# Patient Record
Sex: MALE | Race: WHITE | Employment: OTHER | URBAN - METROPOLITAN AREA
[De-identification: names, ages, dates, MRNs, and addresses within clinical notes are randomized per-mention and may not be internally consistent; named-entity substitution may affect disease eponyms.]

---

## 2017-01-03 ENCOUNTER — OFFICE VISIT (OUTPATIENT)
Dept: FAMILY MEDICINE CLINIC | Age: 47
End: 2017-01-03

## 2017-01-03 VITALS
SYSTOLIC BLOOD PRESSURE: 112 MMHG | OXYGEN SATURATION: 99 % | RESPIRATION RATE: 16 BRPM | BODY MASS INDEX: 20.66 KG/M2 | HEART RATE: 54 BPM | DIASTOLIC BLOOD PRESSURE: 74 MMHG | WEIGHT: 169.7 LBS | HEIGHT: 76 IN

## 2017-01-03 DIAGNOSIS — R07.89 CHRONIC CHEST WALL PAIN: ICD-10-CM

## 2017-01-03 DIAGNOSIS — Z90.2 S/P LOBECTOMY OF LUNG: ICD-10-CM

## 2017-01-03 DIAGNOSIS — J41.0 SIMPLE CHRONIC BRONCHITIS (HCC): ICD-10-CM

## 2017-01-03 DIAGNOSIS — F41.8 DEPRESSION WITH ANXIETY: Primary | ICD-10-CM

## 2017-01-03 DIAGNOSIS — F41.0 PANIC ATTACK: ICD-10-CM

## 2017-01-03 DIAGNOSIS — G89.29 CHRONIC CHEST WALL PAIN: ICD-10-CM

## 2017-01-03 PROCEDURE — 99213 OFFICE O/P EST LOW 20 MIN: CPT | Performed by: NURSE PRACTITIONER

## 2017-01-03 RX ORDER — LIDOCAINE 50 MG/G
1 PATCH TOPICAL DAILY
Qty: 90 PATCH | Refills: 0 | Status: SHIPPED | OUTPATIENT
Start: 2017-01-03 | End: 2017-01-12

## 2017-01-03 RX ORDER — SERTRALINE HYDROCHLORIDE 100 MG/1
200 TABLET, FILM COATED ORAL DAILY
Qty: 60 TABLET | Refills: 1 | Status: SHIPPED | OUTPATIENT
Start: 2017-01-03 | End: 2017-01-12

## 2017-01-03 RX ORDER — LORAZEPAM 0.5 MG/1
0.5 TABLET ORAL EVERY 12 HOURS PRN
Qty: 15 TABLET | Refills: 0 | Status: SHIPPED | OUTPATIENT
Start: 2017-01-03 | End: 2017-01-10

## 2017-01-03 RX ORDER — AMITRIPTYLINE HYDROCHLORIDE 50 MG/1
50 TABLET, FILM COATED ORAL NIGHTLY
Qty: 30 TABLET | Refills: 1 | Status: SHIPPED | OUTPATIENT
Start: 2017-01-03 | End: 2017-02-17 | Stop reason: ALTCHOICE

## 2017-01-04 ENCOUNTER — TELEPHONE (OUTPATIENT)
Dept: FAMILY MEDICINE CLINIC | Age: 47
End: 2017-01-04

## 2017-01-04 ASSESSMENT — ENCOUNTER SYMPTOMS
SHORTNESS OF BREATH: 1
NAUSEA: 0
WHEEZING: 0
COUGH: 0
CHEST TIGHTNESS: 0
ABDOMINAL PAIN: 0

## 2017-01-05 ENCOUNTER — TELEPHONE (OUTPATIENT)
Dept: FAMILY MEDICINE CLINIC | Age: 47
End: 2017-01-05

## 2017-01-05 ENCOUNTER — TELEPHONE (OUTPATIENT)
Age: 47
End: 2017-01-05

## 2017-01-05 DIAGNOSIS — R07.89 CHEST WALL PAIN: Primary | ICD-10-CM

## 2017-01-06 ENCOUNTER — TELEPHONE (OUTPATIENT)
Dept: PULMONOLOGY | Age: 47
End: 2017-01-06

## 2017-01-09 ENCOUNTER — TELEPHONE (OUTPATIENT)
Dept: PULMONOLOGY | Age: 47
End: 2017-01-09

## 2017-01-10 ENCOUNTER — OFFICE VISIT (OUTPATIENT)
Dept: PULMONOLOGY | Age: 47
End: 2017-01-10

## 2017-01-10 ENCOUNTER — TELEPHONE (OUTPATIENT)
Dept: PULMONOLOGY | Age: 47
End: 2017-01-10

## 2017-01-10 VITALS
WEIGHT: 169.2 LBS | HEIGHT: 76 IN | BODY MASS INDEX: 20.6 KG/M2 | HEART RATE: 70 BPM | DIASTOLIC BLOOD PRESSURE: 88 MMHG | SYSTOLIC BLOOD PRESSURE: 124 MMHG | TEMPERATURE: 97.7 F | OXYGEN SATURATION: 98 %

## 2017-01-10 DIAGNOSIS — G89.18 CHEST WALL PAIN FOLLOWING SURGERY: Primary | ICD-10-CM

## 2017-01-10 DIAGNOSIS — R07.89 CHEST WALL PAIN FOLLOWING SURGERY: Primary | ICD-10-CM

## 2017-01-10 DIAGNOSIS — J18.9 PNEUMONIA OF LEFT UPPER LOBE DUE TO INFECTIOUS ORGANISM: ICD-10-CM

## 2017-01-10 DIAGNOSIS — J86.9 EMPYEMA LUNG (HCC): ICD-10-CM

## 2017-01-10 DIAGNOSIS — Z90.2 S/P LOBECTOMY OF LUNG: ICD-10-CM

## 2017-01-10 DIAGNOSIS — B39.9 HISTOPLASMOSIS: ICD-10-CM

## 2017-01-10 PROCEDURE — 99213 OFFICE O/P EST LOW 20 MIN: CPT | Performed by: PHYSICIAN ASSISTANT

## 2017-01-10 ASSESSMENT — ENCOUNTER SYMPTOMS
SHORTNESS OF BREATH: 1
GASTROINTESTINAL NEGATIVE: 1

## 2017-01-12 PROBLEM — F41.9 ANXIETY: Status: ACTIVE | Noted: 2017-01-12

## 2017-01-12 PROBLEM — R07.1 CHEST PAIN ON BREATHING: Status: ACTIVE | Noted: 2017-01-12

## 2017-01-12 PROBLEM — E87.3 ACUTE RESPIRATORY ALKALOSIS: Status: ACTIVE | Noted: 2017-01-12

## 2017-01-12 PROBLEM — R06.02 SHORTNESS OF BREATH: Status: ACTIVE | Noted: 2017-01-12

## 2017-01-12 PROBLEM — G89.4 CHRONIC PAIN SYNDROME: Chronic | Status: ACTIVE | Noted: 2017-01-12

## 2017-01-16 ENCOUNTER — TELEPHONE (OUTPATIENT)
Dept: FAMILY MEDICINE CLINIC | Age: 47
End: 2017-01-16

## 2017-01-17 ENCOUNTER — TELEPHONE (OUTPATIENT)
Dept: FAMILY MEDICINE CLINIC | Age: 47
End: 2017-01-17

## 2017-01-20 ENCOUNTER — TELEPHONE (OUTPATIENT)
Dept: FAMILY MEDICINE CLINIC | Age: 47
End: 2017-01-20

## 2017-01-20 ENCOUNTER — OFFICE VISIT (OUTPATIENT)
Dept: FAMILY MEDICINE CLINIC | Age: 47
End: 2017-01-20

## 2017-01-20 VITALS
SYSTOLIC BLOOD PRESSURE: 96 MMHG | WEIGHT: 163.5 LBS | HEART RATE: 87 BPM | RESPIRATION RATE: 16 BRPM | HEIGHT: 76 IN | DIASTOLIC BLOOD PRESSURE: 66 MMHG | OXYGEN SATURATION: 97 % | TEMPERATURE: 97 F | BODY MASS INDEX: 19.91 KG/M2

## 2017-01-20 DIAGNOSIS — B39.9 HISTOPLASMOSIS: ICD-10-CM

## 2017-01-20 DIAGNOSIS — J43.9 PULMONARY EMPHYSEMA, UNSPECIFIED EMPHYSEMA TYPE (HCC): Primary | ICD-10-CM

## 2017-01-20 DIAGNOSIS — F41.8 DEPRESSION WITH ANXIETY: ICD-10-CM

## 2017-01-20 DIAGNOSIS — Z90.2 S/P LOBECTOMY OF LUNG: ICD-10-CM

## 2017-01-20 PROCEDURE — 99495 TRANSJ CARE MGMT MOD F2F 14D: CPT | Performed by: NURSE PRACTITIONER

## 2017-01-20 RX ORDER — BUPROPION HYDROCHLORIDE 300 MG/1
300 TABLET ORAL EVERY MORNING
Qty: 30 TABLET | Refills: 1 | Status: SHIPPED | OUTPATIENT
Start: 2017-01-20 | End: 2017-02-13 | Stop reason: SDUPTHER

## 2017-01-20 RX ORDER — LORAZEPAM 0.5 MG/1
0.5 TABLET ORAL 2 TIMES DAILY PRN
COMMUNITY
End: 2017-01-20 | Stop reason: SDUPTHER

## 2017-01-20 RX ORDER — LORAZEPAM 0.5 MG/1
0.5 TABLET ORAL 2 TIMES DAILY PRN
Qty: 10 TABLET | Refills: 0 | Status: SHIPPED | OUTPATIENT
Start: 2017-01-20 | End: 2017-02-17 | Stop reason: SDUPTHER

## 2017-01-20 RX ORDER — SERTRALINE HYDROCHLORIDE 100 MG/1
100 TABLET, FILM COATED ORAL DAILY
Qty: 30 TABLET | Refills: 1 | Status: SHIPPED | OUTPATIENT
Start: 2017-01-20 | End: 2017-02-13 | Stop reason: SDUPTHER

## 2017-01-20 ASSESSMENT — PATIENT HEALTH QUESTIONNAIRE - PHQ9
1. LITTLE INTEREST OR PLEASURE IN DOING THINGS: 0
SUM OF ALL RESPONSES TO PHQ QUESTIONS 1-9: 0
SUM OF ALL RESPONSES TO PHQ9 QUESTIONS 1 & 2: 0

## 2017-01-21 ASSESSMENT — ENCOUNTER SYMPTOMS
CHEST TIGHTNESS: 0
SHORTNESS OF BREATH: 1
NAUSEA: 0
COUGH: 0
ABDOMINAL PAIN: 0
WHEEZING: 0

## 2017-01-23 ENCOUNTER — TELEPHONE (OUTPATIENT)
Dept: FAMILY MEDICINE CLINIC | Age: 47
End: 2017-01-23

## 2017-02-13 DIAGNOSIS — F41.8 DEPRESSION WITH ANXIETY: ICD-10-CM

## 2017-02-13 RX ORDER — LORAZEPAM 0.5 MG/1
0.5 TABLET ORAL 2 TIMES DAILY PRN
Qty: 10 TABLET | Refills: 0 | OUTPATIENT
Start: 2017-02-13

## 2017-02-13 RX ORDER — ALBUTEROL SULFATE 90 UG/1
2 AEROSOL, METERED RESPIRATORY (INHALATION) EVERY 6 HOURS PRN
Qty: 1 INHALER | Refills: 3 | Status: SHIPPED | OUTPATIENT
Start: 2017-02-13 | End: 2017-07-17 | Stop reason: ALTCHOICE

## 2017-02-13 RX ORDER — SERTRALINE HYDROCHLORIDE 100 MG/1
100 TABLET, FILM COATED ORAL DAILY
Qty: 30 TABLET | Refills: 1 | Status: SHIPPED | OUTPATIENT
Start: 2017-02-13 | End: 2017-02-17 | Stop reason: ALTCHOICE

## 2017-02-13 RX ORDER — NAPROXEN 500 MG/1
500 TABLET ORAL 2 TIMES DAILY WITH MEALS
Qty: 60 TABLET | Refills: 3 | Status: SHIPPED | OUTPATIENT
Start: 2017-02-13 | End: 2017-11-16 | Stop reason: SDUPTHER

## 2017-02-13 RX ORDER — BUPROPION HYDROCHLORIDE 300 MG/1
300 TABLET ORAL EVERY MORNING
Qty: 30 TABLET | Refills: 1 | Status: SHIPPED | OUTPATIENT
Start: 2017-02-13 | End: 2017-02-17 | Stop reason: SDUPTHER

## 2017-02-17 ENCOUNTER — OFFICE VISIT (OUTPATIENT)
Dept: FAMILY MEDICINE CLINIC | Age: 47
End: 2017-02-17

## 2017-02-17 VITALS
WEIGHT: 179.5 LBS | RESPIRATION RATE: 16 BRPM | OXYGEN SATURATION: 99 % | DIASTOLIC BLOOD PRESSURE: 74 MMHG | BODY MASS INDEX: 21.86 KG/M2 | HEIGHT: 76 IN | TEMPERATURE: 98.1 F | SYSTOLIC BLOOD PRESSURE: 136 MMHG | HEART RATE: 70 BPM

## 2017-02-17 DIAGNOSIS — F41.8 DEPRESSION WITH ANXIETY: Primary | ICD-10-CM

## 2017-02-17 DIAGNOSIS — J44.1 COPD EXACERBATION (HCC): ICD-10-CM

## 2017-02-17 DIAGNOSIS — J41.0 SIMPLE CHRONIC BRONCHITIS (HCC): ICD-10-CM

## 2017-02-17 DIAGNOSIS — G89.29 CHRONIC CHEST WALL PAIN: ICD-10-CM

## 2017-02-17 DIAGNOSIS — R07.89 CHRONIC CHEST WALL PAIN: ICD-10-CM

## 2017-02-17 DIAGNOSIS — Z90.2 S/P LOBECTOMY OF LUNG: ICD-10-CM

## 2017-02-17 PROBLEM — R07.1 CHEST PAIN ON BREATHING: Status: RESOLVED | Noted: 2017-01-12 | Resolved: 2017-02-17

## 2017-02-17 PROBLEM — E87.3 ACUTE RESPIRATORY ALKALOSIS: Status: RESOLVED | Noted: 2017-01-12 | Resolved: 2017-02-17

## 2017-02-17 PROBLEM — R06.02 SHORTNESS OF BREATH: Status: RESOLVED | Noted: 2017-01-12 | Resolved: 2017-02-17

## 2017-02-17 PROCEDURE — 99214 OFFICE O/P EST MOD 30 MIN: CPT | Performed by: NURSE PRACTITIONER

## 2017-02-17 RX ORDER — SERTRALINE HYDROCHLORIDE 100 MG/1
150 TABLET, FILM COATED ORAL DAILY
Qty: 45 TABLET | Refills: 1 | Status: SHIPPED | OUTPATIENT
Start: 2017-02-17 | End: 2017-09-14 | Stop reason: SDUPTHER

## 2017-02-17 RX ORDER — LORAZEPAM 0.5 MG/1
0.5 TABLET ORAL 2 TIMES DAILY PRN
Qty: 10 TABLET | Refills: 0 | Status: ON HOLD | OUTPATIENT
Start: 2017-02-17 | End: 2017-02-28 | Stop reason: HOSPADM

## 2017-02-17 RX ORDER — AMITRIPTYLINE HYDROCHLORIDE 100 MG/1
100 TABLET, FILM COATED ORAL NIGHTLY
Qty: 30 TABLET | Refills: 3 | Status: SHIPPED | OUTPATIENT
Start: 2017-02-17 | End: 2017-04-12 | Stop reason: SDUPTHER

## 2017-02-17 RX ORDER — BUPROPION HYDROCHLORIDE 300 MG/1
300 TABLET ORAL EVERY MORNING
Qty: 30 TABLET | Refills: 11 | Status: SHIPPED | OUTPATIENT
Start: 2017-02-17 | End: 2017-07-17 | Stop reason: ALTCHOICE

## 2017-02-17 RX ORDER — PREDNISONE 20 MG/1
20 TABLET ORAL 3 TIMES DAILY
Qty: 12 TABLET | Refills: 0 | Status: SHIPPED | OUTPATIENT
Start: 2017-02-17 | End: 2017-02-21

## 2017-02-17 ASSESSMENT — ENCOUNTER SYMPTOMS
NAUSEA: 0
ABDOMINAL PAIN: 0
SHORTNESS OF BREATH: 1
WHEEZING: 0
CHEST TIGHTNESS: 0
COUGH: 0

## 2017-02-17 ASSESSMENT — PATIENT HEALTH QUESTIONNAIRE - PHQ9
SUM OF ALL RESPONSES TO PHQ9 QUESTIONS 1 & 2: 0
SUM OF ALL RESPONSES TO PHQ QUESTIONS 1-9: 0
1. LITTLE INTEREST OR PLEASURE IN DOING THINGS: 0

## 2017-02-24 ENCOUNTER — TELEPHONE (OUTPATIENT)
Dept: FAMILY MEDICINE CLINIC | Age: 47
End: 2017-02-24

## 2017-02-24 DIAGNOSIS — J41.0 SIMPLE CHRONIC BRONCHITIS (HCC): Primary | ICD-10-CM

## 2017-02-24 RX ORDER — ALBUTEROL SULFATE 2.5 MG/3ML
2.5 SOLUTION RESPIRATORY (INHALATION) EVERY 6 HOURS PRN
Qty: 120 EACH | Refills: 3 | Status: SHIPPED | OUTPATIENT
Start: 2017-02-24 | End: 2017-07-17 | Stop reason: ALTCHOICE

## 2017-03-01 ENCOUNTER — TELEPHONE (OUTPATIENT)
Dept: FAMILY MEDICINE CLINIC | Age: 47
End: 2017-03-01

## 2017-03-06 ENCOUNTER — OFFICE VISIT (OUTPATIENT)
Dept: FAMILY MEDICINE CLINIC | Age: 47
End: 2017-03-06

## 2017-03-06 VITALS
BODY MASS INDEX: 22.04 KG/M2 | HEART RATE: 68 BPM | OXYGEN SATURATION: 99 % | HEIGHT: 76 IN | DIASTOLIC BLOOD PRESSURE: 70 MMHG | RESPIRATION RATE: 16 BRPM | WEIGHT: 181 LBS | SYSTOLIC BLOOD PRESSURE: 124 MMHG | TEMPERATURE: 97.6 F

## 2017-03-06 DIAGNOSIS — J44.1 COPD EXACERBATION (HCC): Primary | ICD-10-CM

## 2017-03-06 DIAGNOSIS — R07.89 CHEST WALL PAIN: ICD-10-CM

## 2017-03-06 DIAGNOSIS — Z87.891 HISTORY OF TOBACCO ABUSE: ICD-10-CM

## 2017-03-06 DIAGNOSIS — F41.9 ANXIETY: ICD-10-CM

## 2017-03-06 DIAGNOSIS — Z90.2 S/P LOBECTOMY OF LUNG: ICD-10-CM

## 2017-03-06 DIAGNOSIS — J41.0 SIMPLE CHRONIC BRONCHITIS (HCC): ICD-10-CM

## 2017-03-06 PROCEDURE — 99495 TRANSJ CARE MGMT MOD F2F 14D: CPT | Performed by: NURSE PRACTITIONER

## 2017-03-06 RX ORDER — LORAZEPAM 0.5 MG/1
0.5 TABLET ORAL 2 TIMES DAILY
Qty: 10 TABLET | Refills: 0 | Status: SHIPPED | OUTPATIENT
Start: 2017-03-06 | End: 2017-04-17 | Stop reason: ALTCHOICE

## 2017-03-06 RX ORDER — LAMOTRIGINE 100 MG/1
100 TABLET ORAL DAILY
Qty: 30 TABLET | Refills: 1 | Status: SHIPPED | OUTPATIENT
Start: 2017-03-06 | End: 2017-04-12 | Stop reason: SDUPTHER

## 2017-03-06 RX ORDER — GABAPENTIN 100 MG/1
100 CAPSULE ORAL 3 TIMES DAILY
Qty: 90 CAPSULE | Refills: 0 | Status: CANCELLED | OUTPATIENT
Start: 2017-03-06

## 2017-03-06 RX ORDER — LORAZEPAM 0.5 MG/1
0.5 TABLET ORAL 2 TIMES DAILY
COMMUNITY
End: 2017-03-06 | Stop reason: SDUPTHER

## 2017-03-07 ASSESSMENT — ENCOUNTER SYMPTOMS
SHORTNESS OF BREATH: 0
NAUSEA: 0
WHEEZING: 0
CHEST TIGHTNESS: 0
COUGH: 0
ABDOMINAL PAIN: 0

## 2017-03-09 ENCOUNTER — OFFICE VISIT (OUTPATIENT)
Dept: PHYSICAL MEDICINE AND REHAB | Age: 47
End: 2017-03-09

## 2017-03-09 VITALS
WEIGHT: 188.6 LBS | HEART RATE: 77 BPM | SYSTOLIC BLOOD PRESSURE: 137 MMHG | DIASTOLIC BLOOD PRESSURE: 88 MMHG | HEIGHT: 76 IN | BODY MASS INDEX: 22.97 KG/M2

## 2017-03-09 DIAGNOSIS — Z90.2 S/P LOBECTOMY OF LUNG: ICD-10-CM

## 2017-03-09 DIAGNOSIS — G58.8 INTERCOSTAL NEURALGIA: Primary | ICD-10-CM

## 2017-03-09 PROCEDURE — 99244 OFF/OP CNSLTJ NEW/EST MOD 40: CPT | Performed by: PAIN MEDICINE

## 2017-03-09 RX ORDER — GABAPENTIN 300 MG/1
300 CAPSULE ORAL 3 TIMES DAILY
COMMUNITY
End: 2017-03-21 | Stop reason: DRUGHIGH

## 2017-03-09 RX ORDER — GABAPENTIN 300 MG/1
300 CAPSULE ORAL 3 TIMES DAILY
Qty: 90 CAPSULE | Refills: 0 | Status: SHIPPED | OUTPATIENT
Start: 2017-03-09 | End: 2017-03-21 | Stop reason: DRUGHIGH

## 2017-03-09 ASSESSMENT — ENCOUNTER SYMPTOMS
VOMITING: 0
COLOR CHANGE: 0
SORE THROAT: 0
COUGH: 0
BACK PAIN: 0
CHEST TIGHTNESS: 1
DIARRHEA: 0
SHORTNESS OF BREATH: 0
NAUSEA: 0
CONSTIPATION: 0
PHOTOPHOBIA: 0
WHEEZING: 0
SINUS PRESSURE: 0
RHINORRHEA: 0
EYE PAIN: 0
ABDOMINAL PAIN: 0

## 2017-03-14 ENCOUNTER — TELEPHONE (OUTPATIENT)
Dept: PHYSICAL MEDICINE AND REHAB | Age: 47
End: 2017-03-14

## 2017-03-20 ENCOUNTER — TELEPHONE (OUTPATIENT)
Dept: PHYSICAL MEDICINE AND REHAB | Age: 47
End: 2017-03-20

## 2017-03-22 RX ORDER — GABAPENTIN 300 MG/1
CAPSULE ORAL
Qty: 180 CAPSULE | Refills: 0 | Status: SHIPPED | OUTPATIENT
Start: 2017-03-22 | End: 2017-04-19 | Stop reason: SDUPTHER

## 2017-03-23 ENCOUNTER — TELEPHONE (OUTPATIENT)
Age: 47
End: 2017-03-23

## 2017-04-05 ENCOUNTER — OFFICE VISIT (OUTPATIENT)
Dept: PHYSICAL MEDICINE AND REHAB | Age: 47
End: 2017-04-05

## 2017-04-05 VITALS
HEIGHT: 76 IN | DIASTOLIC BLOOD PRESSURE: 96 MMHG | SYSTOLIC BLOOD PRESSURE: 143 MMHG | BODY MASS INDEX: 22.16 KG/M2 | WEIGHT: 182 LBS | HEART RATE: 63 BPM

## 2017-04-05 DIAGNOSIS — R07.81 RIB PAIN ON LEFT SIDE: ICD-10-CM

## 2017-04-05 DIAGNOSIS — G58.8 INTERCOSTAL NEURALGIA: Primary | ICD-10-CM

## 2017-04-05 DIAGNOSIS — G89.4 CHRONIC PAIN SYNDROME: ICD-10-CM

## 2017-04-05 PROCEDURE — 99213 OFFICE O/P EST LOW 20 MIN: CPT | Performed by: NURSE PRACTITIONER

## 2017-04-05 RX ORDER — HYDROCODONE BITARTRATE AND ACETAMINOPHEN 5; 325 MG/1; MG/1
1 TABLET ORAL EVERY 8 HOURS PRN
Qty: 45 TABLET | Refills: 0 | Status: SHIPPED | OUTPATIENT
Start: 2017-04-05 | End: 2017-04-17 | Stop reason: SDUPTHER

## 2017-04-05 ASSESSMENT — ENCOUNTER SYMPTOMS
CHEST TIGHTNESS: 0
SHORTNESS OF BREATH: 0
RHINORRHEA: 0
VOMITING: 0
SINUS PRESSURE: 0
ABDOMINAL PAIN: 0
CONSTIPATION: 0
DIARRHEA: 0
WHEEZING: 0
PHOTOPHOBIA: 0
COLOR CHANGE: 0
BACK PAIN: 0
NAUSEA: 0
COUGH: 0
SORE THROAT: 0
EYE PAIN: 0

## 2017-04-12 DIAGNOSIS — R07.89 CHRONIC CHEST WALL PAIN: ICD-10-CM

## 2017-04-12 DIAGNOSIS — Z90.2 S/P LOBECTOMY OF LUNG: ICD-10-CM

## 2017-04-12 DIAGNOSIS — F41.9 ANXIETY: ICD-10-CM

## 2017-04-12 DIAGNOSIS — G89.29 CHRONIC CHEST WALL PAIN: ICD-10-CM

## 2017-04-12 RX ORDER — AMITRIPTYLINE HYDROCHLORIDE 100 MG/1
100 TABLET, FILM COATED ORAL NIGHTLY
Qty: 30 TABLET | Refills: 3 | Status: SHIPPED | OUTPATIENT
Start: 2017-04-12 | End: 2017-04-17 | Stop reason: ALTCHOICE

## 2017-04-12 RX ORDER — LAMOTRIGINE 100 MG/1
100 TABLET ORAL DAILY
Qty: 30 TABLET | Refills: 1 | Status: SHIPPED | OUTPATIENT
Start: 2017-04-12 | End: 2017-04-17 | Stop reason: SDUPTHER

## 2017-04-12 RX ORDER — LORAZEPAM 0.5 MG/1
0.5 TABLET ORAL 2 TIMES DAILY
Qty: 10 TABLET | Refills: 0 | OUTPATIENT
Start: 2017-04-12

## 2017-04-17 ENCOUNTER — OFFICE VISIT (OUTPATIENT)
Dept: FAMILY MEDICINE CLINIC | Age: 47
End: 2017-04-17

## 2017-04-17 VITALS
BODY MASS INDEX: 22.53 KG/M2 | DIASTOLIC BLOOD PRESSURE: 68 MMHG | OXYGEN SATURATION: 98 % | HEART RATE: 64 BPM | SYSTOLIC BLOOD PRESSURE: 114 MMHG | WEIGHT: 185 LBS | RESPIRATION RATE: 12 BRPM | HEIGHT: 76 IN

## 2017-04-17 DIAGNOSIS — G89.4 CHRONIC PAIN SYNDROME: ICD-10-CM

## 2017-04-17 DIAGNOSIS — G58.8 INTERCOSTAL NEURALGIA: ICD-10-CM

## 2017-04-17 DIAGNOSIS — Z90.2 S/P LOBECTOMY OF LUNG: ICD-10-CM

## 2017-04-17 DIAGNOSIS — R07.89 CHRONIC CHEST WALL PAIN: ICD-10-CM

## 2017-04-17 DIAGNOSIS — Z13.9 SCREENING: ICD-10-CM

## 2017-04-17 DIAGNOSIS — F41.8 DEPRESSION WITH ANXIETY: Primary | ICD-10-CM

## 2017-04-17 DIAGNOSIS — J41.0 SIMPLE CHRONIC BRONCHITIS (HCC): ICD-10-CM

## 2017-04-17 DIAGNOSIS — R07.81 RIB PAIN ON LEFT SIDE: ICD-10-CM

## 2017-04-17 DIAGNOSIS — G89.29 CHRONIC CHEST WALL PAIN: ICD-10-CM

## 2017-04-17 DIAGNOSIS — F41.9 ANXIETY: ICD-10-CM

## 2017-04-17 PROCEDURE — 99214 OFFICE O/P EST MOD 30 MIN: CPT | Performed by: NURSE PRACTITIONER

## 2017-04-17 RX ORDER — LAMOTRIGINE 100 MG/1
100 TABLET ORAL DAILY
Qty: 30 TABLET | Refills: 2 | Status: SHIPPED | OUTPATIENT
Start: 2017-04-17 | End: 2017-09-15 | Stop reason: SDUPTHER

## 2017-04-17 RX ORDER — NORTRIPTYLINE HYDROCHLORIDE 50 MG/1
50 CAPSULE ORAL NIGHTLY
Qty: 30 CAPSULE | Refills: 3 | Status: SHIPPED | OUTPATIENT
Start: 2017-04-17 | End: 2017-09-14 | Stop reason: ALTCHOICE

## 2017-04-17 RX ORDER — HYDROCODONE BITARTRATE AND ACETAMINOPHEN 5; 325 MG/1; MG/1
1 TABLET ORAL EVERY 8 HOURS PRN
Qty: 90 TABLET | Refills: 0 | Status: SHIPPED | OUTPATIENT
Start: 2017-04-17 | End: 2017-05-15 | Stop reason: SDUPTHER

## 2017-04-17 ASSESSMENT — ENCOUNTER SYMPTOMS
SHORTNESS OF BREATH: 0
WHEEZING: 0
ABDOMINAL PAIN: 0
NAUSEA: 0
CHEST TIGHTNESS: 0
COUGH: 0

## 2017-04-19 RX ORDER — GABAPENTIN 300 MG/1
CAPSULE ORAL
Qty: 180 CAPSULE | Refills: 0 | Status: SHIPPED | OUTPATIENT
Start: 2017-04-19 | End: 2017-05-23 | Stop reason: SDUPTHER

## 2017-05-04 ENCOUNTER — TELEPHONE (OUTPATIENT)
Dept: FAMILY MEDICINE CLINIC | Age: 47
End: 2017-05-04

## 2017-05-04 DIAGNOSIS — R11.0 NAUSEA: Primary | ICD-10-CM

## 2017-05-04 RX ORDER — ONDANSETRON 4 MG/1
4 TABLET, FILM COATED ORAL EVERY 8 HOURS PRN
Qty: 15 TABLET | Refills: 0 | Status: SHIPPED | OUTPATIENT
Start: 2017-05-04 | End: 2017-11-16

## 2017-05-15 ENCOUNTER — OFFICE VISIT (OUTPATIENT)
Dept: PHYSICAL MEDICINE AND REHAB | Age: 47
End: 2017-05-15

## 2017-05-15 VITALS
HEART RATE: 55 BPM | WEIGHT: 190 LBS | HEIGHT: 76 IN | SYSTOLIC BLOOD PRESSURE: 123 MMHG | BODY MASS INDEX: 23.14 KG/M2 | DIASTOLIC BLOOD PRESSURE: 79 MMHG

## 2017-05-15 DIAGNOSIS — Z90.2 S/P LOBECTOMY OF LUNG: ICD-10-CM

## 2017-05-15 DIAGNOSIS — R07.81 RIB PAIN ON LEFT SIDE: ICD-10-CM

## 2017-05-15 DIAGNOSIS — G89.29 CHRONIC LEFT-SIDED THORACIC BACK PAIN: Primary | ICD-10-CM

## 2017-05-15 DIAGNOSIS — M54.6 CHRONIC LEFT-SIDED THORACIC BACK PAIN: Primary | ICD-10-CM

## 2017-05-15 DIAGNOSIS — G89.4 CHRONIC PAIN SYNDROME: ICD-10-CM

## 2017-05-15 DIAGNOSIS — G58.8 INTERCOSTAL NEURALGIA: Primary | ICD-10-CM

## 2017-05-15 PROCEDURE — 99213 OFFICE O/P EST LOW 20 MIN: CPT | Performed by: NURSE PRACTITIONER

## 2017-05-15 RX ORDER — HYDROCODONE BITARTRATE AND ACETAMINOPHEN 5; 325 MG/1; MG/1
1 TABLET ORAL EVERY 6 HOURS PRN
Qty: 120 TABLET | Refills: 0 | Status: SHIPPED | OUTPATIENT
Start: 2017-05-17 | End: 2017-06-13 | Stop reason: SDUPTHER

## 2017-05-15 ASSESSMENT — ENCOUNTER SYMPTOMS
PHOTOPHOBIA: 0
EYE DISCHARGE: 0
SORE THROAT: 0
WHEEZING: 0
ANAL BLEEDING: 0
BLOOD IN STOOL: 0
EYE PAIN: 0
APNEA: 0
ABDOMINAL PAIN: 0
NAUSEA: 0
COLOR CHANGE: 0
CONSTIPATION: 0
ABDOMINAL DISTENTION: 0
CHOKING: 0
COUGH: 0
RHINORRHEA: 0
FACIAL SWELLING: 0
EYE REDNESS: 0
STRIDOR: 0
VOICE CHANGE: 0
BACK PAIN: 0
DIARRHEA: 0
RECTAL PAIN: 0
SHORTNESS OF BREATH: 0
TROUBLE SWALLOWING: 0
VOMITING: 0
EYE ITCHING: 0
CHEST TIGHTNESS: 0
SINUS PRESSURE: 0

## 2017-05-23 RX ORDER — GABAPENTIN 300 MG/1
CAPSULE ORAL
Qty: 180 CAPSULE | Refills: 0 | Status: SHIPPED | OUTPATIENT
Start: 2017-05-23 | End: 2017-06-22 | Stop reason: SDUPTHER

## 2017-06-13 DIAGNOSIS — G89.4 CHRONIC PAIN SYNDROME: ICD-10-CM

## 2017-06-13 DIAGNOSIS — R07.81 RIB PAIN ON LEFT SIDE: ICD-10-CM

## 2017-06-13 DIAGNOSIS — Z90.2 S/P LOBECTOMY OF LUNG: ICD-10-CM

## 2017-06-13 DIAGNOSIS — G58.8 INTERCOSTAL NEURALGIA: ICD-10-CM

## 2017-06-14 RX ORDER — HYDROCODONE BITARTRATE AND ACETAMINOPHEN 5; 325 MG/1; MG/1
1 TABLET ORAL EVERY 6 HOURS PRN
Qty: 120 TABLET | Refills: 0 | Status: SHIPPED | OUTPATIENT
Start: 2017-06-16 | End: 2017-07-16

## 2017-06-22 RX ORDER — GABAPENTIN 300 MG/1
CAPSULE ORAL
Qty: 180 CAPSULE | Refills: 0 | Status: SHIPPED | OUTPATIENT
Start: 2017-06-24 | End: 2017-07-17

## 2017-07-17 ENCOUNTER — OFFICE VISIT (OUTPATIENT)
Dept: PHYSICAL MEDICINE AND REHAB | Age: 47
End: 2017-07-17
Payer: MEDICAID

## 2017-07-17 VITALS
DIASTOLIC BLOOD PRESSURE: 76 MMHG | HEART RATE: 55 BPM | SYSTOLIC BLOOD PRESSURE: 123 MMHG | WEIGHT: 189.2 LBS | HEIGHT: 76 IN | BODY MASS INDEX: 23.04 KG/M2

## 2017-07-17 DIAGNOSIS — G89.29 CHRONIC LEFT-SIDED THORACIC BACK PAIN: ICD-10-CM

## 2017-07-17 DIAGNOSIS — G58.8 INTERCOSTAL NEURALGIA: Primary | ICD-10-CM

## 2017-07-17 DIAGNOSIS — G89.4 CHRONIC PAIN SYNDROME: ICD-10-CM

## 2017-07-17 DIAGNOSIS — R07.81 RIB PAIN ON LEFT SIDE: ICD-10-CM

## 2017-07-17 DIAGNOSIS — Z90.2 S/P LOBECTOMY OF LUNG: ICD-10-CM

## 2017-07-17 DIAGNOSIS — M54.6 CHRONIC LEFT-SIDED THORACIC BACK PAIN: ICD-10-CM

## 2017-07-17 PROCEDURE — 99213 OFFICE O/P EST LOW 20 MIN: CPT | Performed by: NURSE PRACTITIONER

## 2017-07-17 RX ORDER — ALBUTEROL SULFATE 90 UG/1
2 AEROSOL, METERED RESPIRATORY (INHALATION) EVERY 6 HOURS PRN
COMMUNITY
End: 2017-09-14 | Stop reason: SDUPTHER

## 2017-07-17 RX ORDER — GABAPENTIN 600 MG/1
600 TABLET ORAL 4 TIMES DAILY
Qty: 120 TABLET | Refills: 0 | Status: SHIPPED | OUTPATIENT
Start: 2017-07-17 | End: 2017-08-14 | Stop reason: SDUPTHER

## 2017-07-17 RX ORDER — HYDROCODONE BITARTRATE AND ACETAMINOPHEN 7.5; 325 MG/1; MG/1
1 TABLET ORAL EVERY 6 HOURS PRN
Qty: 120 TABLET | Refills: 0 | Status: SHIPPED | OUTPATIENT
Start: 2017-07-17 | End: 2017-08-14 | Stop reason: SDUPTHER

## 2017-07-17 RX ORDER — HYDROCODONE BITARTRATE AND ACETAMINOPHEN 5; 325 MG/1; MG/1
1 TABLET ORAL EVERY 6 HOURS PRN
COMMUNITY
End: 2017-08-29 | Stop reason: DRUGHIGH

## 2017-07-17 ASSESSMENT — ENCOUNTER SYMPTOMS
PHOTOPHOBIA: 0
WHEEZING: 0
EYE PAIN: 0
NAUSEA: 0
COLOR CHANGE: 0
DIARRHEA: 0
COUGH: 0
CONSTIPATION: 0
SHORTNESS OF BREATH: 0
SINUS PRESSURE: 0
SORE THROAT: 0
BACK PAIN: 0
CHEST TIGHTNESS: 0
RHINORRHEA: 0
VOMITING: 0
ABDOMINAL PAIN: 0

## 2017-08-14 DIAGNOSIS — R07.81 RIB PAIN ON LEFT SIDE: ICD-10-CM

## 2017-08-14 DIAGNOSIS — Z90.2 S/P LOBECTOMY OF LUNG: ICD-10-CM

## 2017-08-14 DIAGNOSIS — G89.29 CHRONIC LEFT-SIDED THORACIC BACK PAIN: ICD-10-CM

## 2017-08-14 DIAGNOSIS — G89.4 CHRONIC PAIN SYNDROME: ICD-10-CM

## 2017-08-14 DIAGNOSIS — G58.8 INTERCOSTAL NEURALGIA: ICD-10-CM

## 2017-08-14 DIAGNOSIS — M54.6 CHRONIC LEFT-SIDED THORACIC BACK PAIN: ICD-10-CM

## 2017-08-14 RX ORDER — HYDROCODONE BITARTRATE AND ACETAMINOPHEN 7.5; 325 MG/1; MG/1
1 TABLET ORAL EVERY 6 HOURS PRN
Qty: 120 TABLET | Refills: 0 | Status: SHIPPED | OUTPATIENT
Start: 2017-08-14 | End: 2017-09-13

## 2017-08-14 RX ORDER — GABAPENTIN 600 MG/1
600 TABLET ORAL 4 TIMES DAILY
Qty: 120 TABLET | Refills: 0 | Status: SHIPPED | OUTPATIENT
Start: 2017-08-14 | End: 2018-12-14 | Stop reason: ALTCHOICE

## 2017-08-29 ENCOUNTER — OFFICE VISIT (OUTPATIENT)
Dept: PHYSICAL MEDICINE AND REHAB | Age: 47
End: 2017-08-29
Payer: MEDICAID

## 2017-08-29 VITALS
DIASTOLIC BLOOD PRESSURE: 72 MMHG | HEIGHT: 76 IN | WEIGHT: 189 LBS | HEART RATE: 58 BPM | SYSTOLIC BLOOD PRESSURE: 122 MMHG | BODY MASS INDEX: 23.02 KG/M2

## 2017-08-29 DIAGNOSIS — G58.8 INTERCOSTAL NEURALGIA: Primary | ICD-10-CM

## 2017-08-29 DIAGNOSIS — G89.4 CHRONIC PAIN SYNDROME: ICD-10-CM

## 2017-08-29 DIAGNOSIS — Z90.2 S/P LOBECTOMY OF LUNG: ICD-10-CM

## 2017-08-29 PROCEDURE — 99213 OFFICE O/P EST LOW 20 MIN: CPT | Performed by: NURSE PRACTITIONER

## 2017-08-29 ASSESSMENT — ENCOUNTER SYMPTOMS
ABDOMINAL PAIN: 0
RESPIRATORY NEGATIVE: 1
EYES NEGATIVE: 1
WHEEZING: 0
GASTROINTESTINAL NEGATIVE: 1
NAUSEA: 0
COLOR CHANGE: 0
CONSTIPATION: 0
ALLERGIC/IMMUNOLOGIC NEGATIVE: 1
PHOTOPHOBIA: 0
VOMITING: 0
BACK PAIN: 1
EYE PAIN: 0
COUGH: 0
SHORTNESS OF BREATH: 0
RHINORRHEA: 0
SORE THROAT: 0
SINUS PRESSURE: 0
DIARRHEA: 0
CHEST TIGHTNESS: 0

## 2017-09-14 ENCOUNTER — OFFICE VISIT (OUTPATIENT)
Dept: FAMILY MEDICINE CLINIC | Age: 47
End: 2017-09-14
Payer: MEDICAID

## 2017-09-14 ENCOUNTER — TELEPHONE (OUTPATIENT)
Dept: FAMILY MEDICINE CLINIC | Age: 47
End: 2017-09-14

## 2017-09-14 VITALS
DIASTOLIC BLOOD PRESSURE: 60 MMHG | WEIGHT: 170.7 LBS | BODY MASS INDEX: 21.22 KG/M2 | HEIGHT: 75 IN | RESPIRATION RATE: 12 BRPM | HEART RATE: 60 BPM | SYSTOLIC BLOOD PRESSURE: 128 MMHG

## 2017-09-14 DIAGNOSIS — F17.200 SMOKING: ICD-10-CM

## 2017-09-14 DIAGNOSIS — Z90.2 S/P LOBECTOMY OF LUNG: ICD-10-CM

## 2017-09-14 DIAGNOSIS — Z13.220 SCREENING FOR HYPERLIPIDEMIA: ICD-10-CM

## 2017-09-14 DIAGNOSIS — G89.18 CHEST WALL PAIN FOLLOWING SURGERY: ICD-10-CM

## 2017-09-14 DIAGNOSIS — F41.8 DEPRESSION WITH ANXIETY: Primary | ICD-10-CM

## 2017-09-14 DIAGNOSIS — R07.89 CHEST WALL PAIN FOLLOWING SURGERY: ICD-10-CM

## 2017-09-14 DIAGNOSIS — G58.8 INTERCOSTAL NEURALGIA: ICD-10-CM

## 2017-09-14 DIAGNOSIS — J41.0 SIMPLE CHRONIC BRONCHITIS (HCC): ICD-10-CM

## 2017-09-14 PROBLEM — F41.9 ANXIETY: Status: RESOLVED | Noted: 2017-01-12 | Resolved: 2017-09-14

## 2017-09-14 PROBLEM — E87.3 RESPIRATORY ALKALOSIS: Status: RESOLVED | Noted: 2017-01-12 | Resolved: 2017-09-14

## 2017-09-14 PROCEDURE — 99214 OFFICE O/P EST MOD 30 MIN: CPT | Performed by: NURSE PRACTITIONER

## 2017-09-14 RX ORDER — BACLOFEN 20 MG/1
TABLET ORAL
Qty: 20 TABLET | Refills: 0 | Status: SHIPPED | OUTPATIENT
Start: 2017-09-14 | End: 2017-11-16

## 2017-09-14 RX ORDER — NORTRIPTYLINE HYDROCHLORIDE 50 MG/1
50 CAPSULE ORAL NIGHTLY
Qty: 30 CAPSULE | Refills: 3 | Status: CANCELLED | OUTPATIENT
Start: 2017-09-14

## 2017-09-14 RX ORDER — GABAPENTIN 600 MG/1
600 TABLET ORAL 4 TIMES DAILY
Qty: 120 TABLET | Refills: 0 | Status: CANCELLED | OUTPATIENT
Start: 2017-09-14 | End: 2017-10-14

## 2017-09-14 RX ORDER — FLUTICASONE FUROATE AND VILANTEROL 200; 25 UG/1; UG/1
1 POWDER RESPIRATORY (INHALATION) DAILY
Qty: 30 EACH | Refills: 11 | Status: SHIPPED | OUTPATIENT
Start: 2017-09-14 | End: 2017-09-15 | Stop reason: ALTCHOICE

## 2017-09-14 RX ORDER — SERTRALINE HYDROCHLORIDE 100 MG/1
100 TABLET, FILM COATED ORAL DAILY
Qty: 30 TABLET | Refills: 11 | Status: SHIPPED | OUTPATIENT
Start: 2017-09-14 | End: 2017-11-16

## 2017-09-14 RX ORDER — ALBUTEROL SULFATE 90 UG/1
2 AEROSOL, METERED RESPIRATORY (INHALATION) EVERY 6 HOURS PRN
Qty: 1 INHALER | Refills: 2 | Status: SHIPPED | OUTPATIENT
Start: 2017-09-14 | End: 2019-05-21 | Stop reason: SDUPTHER

## 2017-09-14 RX ORDER — LAMOTRIGINE 100 MG/1
100 TABLET ORAL DAILY
Qty: 30 TABLET | Refills: 2 | Status: CANCELLED | OUTPATIENT
Start: 2017-09-14

## 2017-09-14 RX ORDER — ONDANSETRON 4 MG/1
4 TABLET, FILM COATED ORAL EVERY 8 HOURS PRN
Qty: 15 TABLET | Refills: 0 | Status: CANCELLED | OUTPATIENT
Start: 2017-09-14

## 2017-09-14 ASSESSMENT — ENCOUNTER SYMPTOMS
WHEEZING: 0
ABDOMINAL PAIN: 0
SHORTNESS OF BREATH: 1
NAUSEA: 0
CHEST TIGHTNESS: 0
COUGH: 0

## 2017-09-15 DIAGNOSIS — F41.9 ANXIETY: ICD-10-CM

## 2017-09-15 DIAGNOSIS — J41.0 SIMPLE CHRONIC BRONCHITIS (HCC): Primary | ICD-10-CM

## 2017-09-15 RX ORDER — BUDESONIDE AND FORMOTEROL FUMARATE DIHYDRATE 160; 4.5 UG/1; UG/1
2 AEROSOL RESPIRATORY (INHALATION) 2 TIMES DAILY
Qty: 1 INHALER | Refills: 5 | Status: SHIPPED | OUTPATIENT
Start: 2017-09-15 | End: 2017-11-20 | Stop reason: ALTCHOICE

## 2017-09-15 RX ORDER — LAMOTRIGINE 100 MG/1
100 TABLET ORAL DAILY
Qty: 30 TABLET | Refills: 5 | Status: SHIPPED | OUTPATIENT
Start: 2017-09-15 | End: 2017-11-16

## 2017-09-22 ENCOUNTER — TELEPHONE (OUTPATIENT)
Dept: FAMILY MEDICINE CLINIC | Age: 47
End: 2017-09-22

## 2017-09-25 ENCOUNTER — TELEPHONE (OUTPATIENT)
Dept: FAMILY MEDICINE CLINIC | Age: 47
End: 2017-09-25

## 2017-09-25 DIAGNOSIS — J41.0 SIMPLE CHRONIC BRONCHITIS (HCC): Primary | ICD-10-CM

## 2017-09-25 RX ORDER — FLUTICASONE PROPIONATE AND SALMETEROL 232; 14 UG/1; UG/1
1 POWDER, METERED RESPIRATORY (INHALATION) 2 TIMES DAILY
Qty: 1 EACH | Refills: 11 | Status: SHIPPED | OUTPATIENT
Start: 2017-09-25 | End: 2018-12-14 | Stop reason: SDUPTHER

## 2017-10-19 ENCOUNTER — TELEPHONE (OUTPATIENT)
Dept: FAMILY MEDICINE CLINIC | Age: 47
End: 2017-10-19

## 2017-10-19 DIAGNOSIS — R05.9 COUGH: Primary | ICD-10-CM

## 2017-10-19 RX ORDER — BENZONATATE 100 MG/1
200 CAPSULE ORAL 3 TIMES DAILY PRN
Qty: 30 CAPSULE | Refills: 1 | Status: SHIPPED | OUTPATIENT
Start: 2017-10-19 | End: 2017-11-20 | Stop reason: SDUPTHER

## 2017-10-19 NOTE — TELEPHONE ENCOUNTER
Karly Cartagena called and stated that patient developed a bad cough, fever and body aches yesterday. He does not have transportation to come in today. She is requesting something for the cough. [facility]. DOLV 9/14/17, DONV 3/14/18.

## 2017-11-16 ENCOUNTER — APPOINTMENT (OUTPATIENT)
Dept: CT IMAGING | Age: 47
End: 2017-11-16
Payer: MEDICARE

## 2017-11-16 ENCOUNTER — APPOINTMENT (OUTPATIENT)
Dept: GENERAL RADIOLOGY | Age: 47
End: 2017-11-16
Payer: MEDICARE

## 2017-11-16 ENCOUNTER — HOSPITAL ENCOUNTER (EMERGENCY)
Age: 47
Discharge: HOME | End: 2017-11-16
Attending: FAMILY MEDICINE
Payer: MEDICARE

## 2017-11-16 VITALS
HEART RATE: 58 BPM | WEIGHT: 180 LBS | RESPIRATION RATE: 18 BRPM | HEIGHT: 76 IN | OXYGEN SATURATION: 98 % | BODY MASS INDEX: 21.92 KG/M2 | TEMPERATURE: 98.1 F | DIASTOLIC BLOOD PRESSURE: 84 MMHG | SYSTOLIC BLOOD PRESSURE: 128 MMHG

## 2017-11-16 DIAGNOSIS — R07.81 PLEURITIC CHEST PAIN: ICD-10-CM

## 2017-11-16 DIAGNOSIS — J44.1 COPD EXACERBATION (HCC): Primary | ICD-10-CM

## 2017-11-16 DIAGNOSIS — Z90.2 HISTORY OF LOBECTOMY OF LUNG: ICD-10-CM

## 2017-11-16 LAB
ALBUMIN SERPL-MCNC: 3.9 G/DL (ref 3.5–5.1)
ALP BLD-CCNC: 83 U/L (ref 38–126)
ALT SERPL-CCNC: 14 U/L (ref 11–66)
ANION GAP SERPL CALCULATED.3IONS-SCNC: 9 MEQ/L (ref 8–16)
AST SERPL-CCNC: 21 U/L (ref 5–40)
BASOPHILS # BLD: 0.2 %
BASOPHILS ABSOLUTE: 0 THOU/MM3 (ref 0–0.1)
BILIRUB SERPL-MCNC: 0.5 MG/DL (ref 0.3–1.2)
BILIRUBIN DIRECT: < 0.2 MG/DL (ref 0–0.3)
BUN BLDV-MCNC: 14 MG/DL (ref 7–22)
CALCIUM SERPL-MCNC: 9.6 MG/DL (ref 8.5–10.5)
CHLORIDE BLD-SCNC: 100 MEQ/L (ref 98–111)
CO2: 31 MEQ/L (ref 23–33)
CREAT SERPL-MCNC: 1.1 MG/DL (ref 0.4–1.2)
D-DIMER QUANTITATIVE: 624 NG/ML FEU (ref 0–500)
EKG ATRIAL RATE: 55 BPM
EKG P AXIS: 78 DEGREES
EKG P-R INTERVAL: 176 MS
EKG Q-T INTERVAL: 428 MS
EKG QRS DURATION: 96 MS
EKG QTC CALCULATION (BAZETT): 409 MS
EKG R AXIS: -12 DEGREES
EKG T AXIS: 43 DEGREES
EKG VENTRICULAR RATE: 55 BPM
EOSINOPHIL # BLD: 3.3 %
EOSINOPHILS ABSOLUTE: 0.3 THOU/MM3 (ref 0–0.4)
GFR SERPL CREATININE-BSD FRML MDRD: 71 ML/MIN/1.73M2
GLUCOSE BLD-MCNC: 70 MG/DL (ref 70–108)
HCT VFR BLD CALC: 41.8 % (ref 42–52)
HEMOGLOBIN: 14.5 GM/DL (ref 14–18)
LIPASE: 35.4 U/L (ref 5.6–51.3)
LYMPHOCYTES # BLD: 25.6 %
LYMPHOCYTES ABSOLUTE: 2.2 THOU/MM3 (ref 1–4.8)
MAGNESIUM: 2 MG/DL (ref 1.6–2.4)
MCH RBC QN AUTO: 29.5 PG (ref 27–31)
MCHC RBC AUTO-ENTMCNC: 34.7 GM/DL (ref 33–37)
MCV RBC AUTO: 84.9 FL (ref 80–94)
MONOCYTES # BLD: 8.5 %
MONOCYTES ABSOLUTE: 0.7 THOU/MM3 (ref 0.4–1.3)
NUCLEATED RED BLOOD CELLS: 0 /100 WBC
OSMOLALITY CALCULATION: 278.3 MOSMOL/KG (ref 275–300)
PDW BLD-RTO: 13.7 % (ref 11.5–14.5)
PLATELET # BLD: 401 THOU/MM3 (ref 130–400)
PMV BLD AUTO: 7.4 MCM (ref 7.4–10.4)
POTASSIUM SERPL-SCNC: 4.5 MEQ/L (ref 3.5–5.2)
PRO-BNP: 70.9 PG/ML (ref 0–450)
RBC # BLD: 4.93 MILL/MM3 (ref 4.7–6.1)
SEG NEUTROPHILS: 62.4 %
SEGMENTED NEUTROPHILS ABSOLUTE COUNT: 5.4 THOU/MM3 (ref 1.8–7.7)
SODIUM BLD-SCNC: 140 MEQ/L (ref 135–145)
TOTAL PROTEIN: 8.2 G/DL (ref 6.1–8)
TROPONIN T: < 0.01 NG/ML
WBC # BLD: 8.7 THOU/MM3 (ref 4.8–10.8)

## 2017-11-16 PROCEDURE — 6370000000 HC RX 637 (ALT 250 FOR IP): Performed by: FAMILY MEDICINE

## 2017-11-16 PROCEDURE — 94640 AIRWAY INHALATION TREATMENT: CPT

## 2017-11-16 PROCEDURE — 99285 EMERGENCY DEPT VISIT HI MDM: CPT

## 2017-11-16 PROCEDURE — 6360000004 HC RX CONTRAST MEDICATION: Performed by: FAMILY MEDICINE

## 2017-11-16 PROCEDURE — 80053 COMPREHEN METABOLIC PANEL: CPT

## 2017-11-16 PROCEDURE — 71010 XR CHEST PORTABLE: CPT

## 2017-11-16 PROCEDURE — 83735 ASSAY OF MAGNESIUM: CPT

## 2017-11-16 PROCEDURE — 6360000002 HC RX W HCPCS: Performed by: FAMILY MEDICINE

## 2017-11-16 PROCEDURE — 83690 ASSAY OF LIPASE: CPT

## 2017-11-16 PROCEDURE — 83880 ASSAY OF NATRIURETIC PEPTIDE: CPT

## 2017-11-16 PROCEDURE — 36415 COLL VENOUS BLD VENIPUNCTURE: CPT

## 2017-11-16 PROCEDURE — 82248 BILIRUBIN DIRECT: CPT

## 2017-11-16 PROCEDURE — 93005 ELECTROCARDIOGRAM TRACING: CPT

## 2017-11-16 PROCEDURE — 96374 THER/PROPH/DIAG INJ IV PUSH: CPT

## 2017-11-16 PROCEDURE — 85379 FIBRIN DEGRADATION QUANT: CPT

## 2017-11-16 PROCEDURE — 84484 ASSAY OF TROPONIN QUANT: CPT

## 2017-11-16 PROCEDURE — 71275 CT ANGIOGRAPHY CHEST: CPT

## 2017-11-16 PROCEDURE — 85025 COMPLETE CBC W/AUTO DIFF WBC: CPT

## 2017-11-16 RX ORDER — AZITHROMYCIN 250 MG/1
TABLET, FILM COATED ORAL
Qty: 6 TABLET | Refills: 0 | Status: SHIPPED | OUTPATIENT
Start: 2017-11-16 | End: 2017-11-20 | Stop reason: ALTCHOICE

## 2017-11-16 RX ORDER — PREDNISONE 20 MG/1
40 TABLET ORAL DAILY
Qty: 10 TABLET | Refills: 0 | Status: SHIPPED | OUTPATIENT
Start: 2017-11-16 | End: 2017-11-21

## 2017-11-16 RX ORDER — ACETAMINOPHEN 500 MG
1000 TABLET ORAL ONCE
Status: COMPLETED | OUTPATIENT
Start: 2017-11-16 | End: 2017-11-16

## 2017-11-16 RX ORDER — HYDROCODONE BITARTRATE AND ACETAMINOPHEN 5; 325 MG/1; MG/1
1 TABLET ORAL ONCE
Status: COMPLETED | OUTPATIENT
Start: 2017-11-16 | End: 2017-11-16

## 2017-11-16 RX ORDER — ASPIRIN 81 MG/1
324 TABLET, CHEWABLE ORAL ONCE
Status: COMPLETED | OUTPATIENT
Start: 2017-11-16 | End: 2017-11-16

## 2017-11-16 RX ORDER — IPRATROPIUM BROMIDE AND ALBUTEROL SULFATE 2.5; .5 MG/3ML; MG/3ML
1 SOLUTION RESPIRATORY (INHALATION) ONCE
Status: COMPLETED | OUTPATIENT
Start: 2017-11-16 | End: 2017-11-16

## 2017-11-16 RX ORDER — NAPROXEN 500 MG/1
500 TABLET ORAL 2 TIMES DAILY
Qty: 20 TABLET | Refills: 0 | Status: SHIPPED | OUTPATIENT
Start: 2017-11-16 | End: 2018-12-14

## 2017-11-16 RX ORDER — METHYLPREDNISOLONE SODIUM SUCCINATE 125 MG/2ML
125 INJECTION, POWDER, LYOPHILIZED, FOR SOLUTION INTRAMUSCULAR; INTRAVENOUS ONCE
Status: COMPLETED | OUTPATIENT
Start: 2017-11-16 | End: 2017-11-16

## 2017-11-16 RX ADMIN — IOPAMIDOL 80 ML: 755 INJECTION, SOLUTION INTRAVENOUS at 17:18

## 2017-11-16 RX ADMIN — HYDROCODONE BITARTRATE AND ACETAMINOPHEN 1 TABLET: 5; 325 TABLET ORAL at 17:45

## 2017-11-16 RX ADMIN — METHYLPREDNISOLONE SODIUM SUCCINATE 125 MG: 125 INJECTION, POWDER, FOR SOLUTION INTRAMUSCULAR; INTRAVENOUS at 16:44

## 2017-11-16 RX ADMIN — ACETAMINOPHEN 1000 MG: 500 TABLET ORAL at 16:43

## 2017-11-16 RX ADMIN — ASPIRIN 81 MG 324 MG: 81 TABLET ORAL at 15:00

## 2017-11-16 RX ADMIN — IPRATROPIUM BROMIDE AND ALBUTEROL SULFATE 1 AMPULE: .5; 3 SOLUTION RESPIRATORY (INHALATION) at 15:57

## 2017-11-16 ASSESSMENT — PAIN SCALES - GENERAL
PAINLEVEL_OUTOF10: 9

## 2017-11-16 ASSESSMENT — PAIN DESCRIPTION - LOCATION: LOCATION: CHEST

## 2017-11-16 ASSESSMENT — PAIN DESCRIPTION - PAIN TYPE: TYPE: ACUTE PAIN

## 2017-11-16 ASSESSMENT — PAIN DESCRIPTION - ORIENTATION: ORIENTATION: LEFT

## 2017-11-16 NOTE — ED NOTES
Bed: 004A  Expected date: 11/16/17  Expected time:   Means of arrival: [employer] Dept  Comments:     Demarcus Neville RN  11/16/17 9322

## 2017-11-16 NOTE — ED NOTES
Pt on call light requesting something for his \"fucking pain\". Dr. Leo Mendoza notified and gave verbal order of 1000mg of tylenol. Ordered placed. Pt then stated that he has tylenol and ibuprofen at home. Pt and significant other talking back and forth stated he doesn't want tylenol. That's not going to help anything. Significant other stated \"you have to call him a cab. \" Leti Williamson RN updated. Pt refusing tylenol for pain management at this time.       Kathleen Evans RN  11/16/17 6080

## 2017-11-16 NOTE — ED NOTES
Patient medicated per order. Request ice water - swallow screen performed, patient passed, no signs of aspiration noted.       Racquel Hernandez RN  11/16/17 1098

## 2017-11-16 NOTE — ED NOTES
Patient and girlfriend agitated. Patient requesting to leave, stating \"i'm not going to sit here in pain. I can take tylenol at home. I'll just leave\". Extensive time spent with patient to diffuse the situation. Dr. Shari Gomes in room to speak with patient as well. Patient's girlfriend agitated and raising voice stating \"so he's been here for two hours and you guys can't figure out what wrong. So now he's got to stay here even longer\". Girlfriend was not here previously, so at this time she was informed of plan of care. Informed that patient will require another test to rule out blood clots in the lung. Patient and girlfriend updated on further plans, updated that he will be given something else for pain if the Tylenol does not help. Patient verbalizes understanding, agreeable to plan, agrees to stay.       Jose Gallego RN  11/16/17 7252

## 2017-11-17 ENCOUNTER — TELEPHONE (OUTPATIENT)
Dept: FAMILY MEDICINE CLINIC | Age: 47
End: 2017-11-17

## 2017-11-17 DIAGNOSIS — R05.9 COUGH: Primary | ICD-10-CM

## 2017-11-17 RX ORDER — DEXTROMETHORPHAN HYDROBROMIDE AND PROMETHAZINE HYDROCHLORIDE 15; 6.25 MG/5ML; MG/5ML
5 SYRUP ORAL 4 TIMES DAILY PRN
Qty: 240 ML | Refills: 0 | Status: SHIPPED | OUTPATIENT
Start: 2017-11-17 | End: 2017-11-20 | Stop reason: SDUPTHER

## 2017-11-17 ASSESSMENT — ENCOUNTER SYMPTOMS
DIARRHEA: 0
BACK PAIN: 0
VOMITING: 0
NAUSEA: 0
BLOOD IN STOOL: 0
CHEST TIGHTNESS: 1
COUGH: 0
RHINORRHEA: 1
CONSTIPATION: 0
WHEEZING: 0
SINUS PRESSURE: 1
ABDOMINAL PAIN: 0
SHORTNESS OF BREATH: 1

## 2017-11-17 NOTE — TELEPHONE ENCOUNTER
Patient was at [facility] yesterday for chest pain and shortness of breath. He stated his COPD flared up. He's scheduled for an E/R follow up on 11/20/17, but he's requesting a cough medicine today. [facility] [place]. DOLV 9/14/17.

## 2017-11-17 NOTE — ED PROVIDER NOTES
hernia. SURGICAL HISTORY      has a past surgical history that includes Hydrocele surgery (); lobectomy (Left, 2016); and other surgical history (2017). CURRENT MEDICATIONS       Discharge Medication List as of 2017  6:13 PM      CONTINUE these medications which have NOT CHANGED    Details   Fluticasone-Salmeterol (AIRDUO RESPICLICK 845/64) 064-12 MCG/ACT AEPB Inhale 1 puff into the lungs 2 times daily, Disp-1 each, R-11Normal      budesonide-formoterol (SYMBICORT) 160-4.5 MCG/ACT AERO Inhale 2 puffs into the lungs 2 times daily Rinse mouth after use., Disp-1 Inhaler, R-5Normal      albuterol sulfate  (90 Base) MCG/ACT inhaler Inhale 2 puffs into the lungs every 6 hours as needed for Wheezing, Disp-1 Inhaler, R-2Normal      gabapentin (NEURONTIN) 600 MG tablet Take 1 tablet by mouth 4 times daily, Disp-120 tablet, R-0Normal      acetaminophen (TYLENOL) 325 MG tablet Take 2 tablets by mouth every 4 hours as needed for Pain or Fever, Disp-120 tablet, R-3      Respiratory Therapy Supplies (NEBULIZER COMPRESSOR) KIT ONCE Starting 2017, Disp-1 kit, R-0, Print             ALLERGIES     is allergic to penicillins and tramadol. FAMILY HISTORY     indicated that his mother is alive. He indicated that his father is . He indicated that his sister is alive. He indicated that his brother is alive. family history includes Depression (age of onset: 40) in his brother; Diabetes in his father and mother; High Blood Pressure in his mother. SOCIAL HISTORY      reports that he has been smoking. He started smoking about 31 years ago. He has been smoking about 0.25 packs per day. He has never used smokeless tobacco. He reports that he does not drink alcohol or use drugs. PHYSICAL EXAM     INITIAL VITALS:  height is 6' 4\" (1.93 m) and weight is 180 lb (81.6 kg). His oral temperature is 98.1 °F (36.7 °C). His blood pressure is 128/84 and his pulse is 58.  His respiration is 18 and oxygen saturation is 98%. Physical Exam   Constitutional: He is oriented to person, place, and time. He appears well-developed and well-nourished. No distress. HENT:   Head: Normocephalic and atraumatic. Nose: Nose normal.   Mouth/Throat: Oropharynx is clear and moist. No oropharyngeal exudate. Eyes: Conjunctivae and EOM are normal. Pupils are equal, round, and reactive to light. Neck: Normal range of motion. Neck supple. No JVD present. No tracheal deviation present. No thyromegaly present. Cardiovascular: Normal rate, regular rhythm, normal heart sounds and intact distal pulses. Exam reveals no gallop and no friction rub. No murmur heard. Pulmonary/Chest: Effort normal. No stridor. No respiratory distress. He has wheezes. He has no rales. He exhibits no tenderness. Abdominal: Soft. Bowel sounds are normal. He exhibits no distension and no mass. There is no tenderness. There is no rebound and no guarding. Lymphadenopathy:     He has no cervical adenopathy. Neurological: He is alert and oriented to person, place, and time. No cranial nerve deficit. Skin: Skin is warm. No rash noted. He is not diaphoretic. DIFFERENTIAL DIAGNOSIS:   COPD, CHF, ACS, MI, PTX, PNA, PE, Dissection    DIAGNOSTIC RESULTS     EKG: All EKG's are interpreted by the Emergency Department Physician who either signs or Co-signs this chart in the absence of a cardiologist.  EKG interpreted by No att. providers found:    EKG shows sinus bradycardia without acute changes compared to previous EKG. RADIOLOGY: non-plain film images(s) such as CT, Ultrasound and MRI are read by the radiologist.    CTA Chest W WO Contrast   Final Result   1. No pulmonary emboli are seen. 2. Severely fibroemphysematous right lung. Multiple bullous lesions are present. Postsurgical changes left lung with extensive chronic fibrotic scarring left mid and lower lung fields. 3. No acute findings. . There are a few noncalcified nodules 11/16/17 1603 11/16/17 1722   BP: 133/66 (!) 134/95 126/88 128/84   Pulse: 58      Resp: 18      Temp: 98.1 °F (36.7 °C)      TempSrc: Oral      SpO2: 96% 97% 100% 98%   Weight: 180 lb (81.6 kg)      Height: 6' 4\" (1.93 m)          MDM  Number of Diagnoses or Management Options  COPD exacerbation ([facility] [address] [address]):   History of lobectomy of lung:   Pleuritic chest pain:   Diagnosis management comments: I estimate there is LOW risk for PULMONARY EMBOLISM, ACUTE CORONARY SYNDROME, PULMONARY EDEMA, PNEUMONIA, PNEUMOTHORAX, STATUS ASTHMATICUS, ACUTE RESPIRATORY FAILURE,OR THORACIC AORTIC DISSECTION, thus I consider the discharge disposition reasonable. The patient and/or family and I have discussed the diagnosis and risks, and we agree with discharging home to follow-up with their primary doctor. We also discussed returning to the Emergency Department immediately if new or worsening symptoms occur. We have discussed the symptoms which are most concerning (e.g., bloody sputum, fever, worsening pain or shortness of breath, vomiting) that necessitate immediate return. 10:48 PM: The patient was seen and evaluated. CRITICAL CARE:   None    CONSULTS:  None    PROCEDURES:  None     FINAL IMPRESSION      1. COPD exacerbation ([facility] [address]    2. History of lobectomy of lung    3.  Pleuritic chest pain          DISPOSITION/PLAN   D/C    PATIENT REFERRED TO:  Juan Pablo De Leon NP  [address] [place] [address] Road [facility]  [phone]    Schedule an appointment as soon as possible for a visit in 1 week      [facility] [address] [address] EMERGENCY DEPT  [address]  Go to   If symptoms worsen      DISCHARGE MEDICATIONS:  Discharge Medication List as of 11/16/2017  6:13 PM      START taking these medications    Details   predniSONE (DELTASONE) 20 MG tablet Take 2 tablets by mouth daily for 5 days, Disp-10 tablet, R-0Print      azithromycin (ZITHROMAX) 250 MG tablet Take 2 tablets on day 1, then 1 tablet daily for 4

## 2017-11-20 ENCOUNTER — OFFICE VISIT (OUTPATIENT)
Dept: FAMILY MEDICINE CLINIC | Age: 47
End: 2017-11-20
Payer: MEDICARE

## 2017-11-20 VITALS
DIASTOLIC BLOOD PRESSURE: 76 MMHG | BODY MASS INDEX: 21.56 KG/M2 | TEMPERATURE: 98.2 F | HEART RATE: 56 BPM | WEIGHT: 177.1 LBS | SYSTOLIC BLOOD PRESSURE: 130 MMHG | RESPIRATION RATE: 16 BRPM

## 2017-11-20 DIAGNOSIS — R05.9 COUGH: ICD-10-CM

## 2017-11-20 DIAGNOSIS — J41.0 SIMPLE CHRONIC BRONCHITIS (HCC): ICD-10-CM

## 2017-11-20 DIAGNOSIS — R91.8 LUNG NODULES: ICD-10-CM

## 2017-11-20 DIAGNOSIS — G58.8 INTERCOSTAL NEURALGIA: ICD-10-CM

## 2017-11-20 DIAGNOSIS — R07.82 INTERCOSTAL PAIN: Primary | ICD-10-CM

## 2017-11-20 PROCEDURE — G8926 SPIRO NO PERF OR DOC: HCPCS | Performed by: NURSE PRACTITIONER

## 2017-11-20 PROCEDURE — G8484 FLU IMMUNIZE NO ADMIN: HCPCS | Performed by: NURSE PRACTITIONER

## 2017-11-20 PROCEDURE — G8420 CALC BMI NORM PARAMETERS: HCPCS | Performed by: NURSE PRACTITIONER

## 2017-11-20 PROCEDURE — 99213 OFFICE O/P EST LOW 20 MIN: CPT | Performed by: NURSE PRACTITIONER

## 2017-11-20 PROCEDURE — 4004F PT TOBACCO SCREEN RCVD TLK: CPT | Performed by: NURSE PRACTITIONER

## 2017-11-20 PROCEDURE — 3023F SPIROM DOC REV: CPT | Performed by: NURSE PRACTITIONER

## 2017-11-20 PROCEDURE — G8427 DOCREV CUR MEDS BY ELIG CLIN: HCPCS | Performed by: NURSE PRACTITIONER

## 2017-11-20 RX ORDER — DEXTROMETHORPHAN HYDROBROMIDE AND PROMETHAZINE HYDROCHLORIDE 15; 6.25 MG/5ML; MG/5ML
5 SYRUP ORAL 4 TIMES DAILY PRN
Qty: 240 ML | Refills: 0 | Status: SHIPPED | OUTPATIENT
Start: 2017-11-20 | End: 2017-11-27

## 2017-11-20 RX ORDER — IPRATROPIUM BROMIDE AND ALBUTEROL SULFATE 2.5; .5 MG/3ML; MG/3ML
1 SOLUTION RESPIRATORY (INHALATION) EVERY 4 HOURS
COMMUNITY

## 2017-11-20 ASSESSMENT — ENCOUNTER SYMPTOMS
ABDOMINAL PAIN: 0
COUGH: 1
CHEST TIGHTNESS: 0
SHORTNESS OF BREATH: 0
NAUSEA: 0

## 2017-11-20 NOTE — PROGRESS NOTES
appearance. No distress. HENT:   Right Ear: Tympanic membrane normal.   Left Ear: Tympanic membrane normal.   Nose: Nose normal.   Mouth/Throat: Oropharynx is clear and moist and mucous membranes are normal.   Cardiovascular: Normal rate, regular rhythm, S1 normal, S2 normal, normal heart sounds and normal pulses. Exam reveals no S3. No murmur heard. Pulmonary/Chest: Effort normal. No accessory muscle usage. No tachypnea. No respiratory distress. He has decreased breath sounds. He has no wheezes. He has no rhonchi. He exhibits tenderness and bony tenderness (left rib cage lateral and anterior). He exhibits no crepitus. Abdominal: Soft. Bowel sounds are normal. There is no tenderness. Neurological: He is alert and oriented to person, place, and time. Psychiatric: His speech is normal. Judgment normal. His mood appears not anxious. He is not slowed and not withdrawn. Thought content is not paranoid. Cognition and memory are normal. He does not exhibit a depressed mood. Assessment:      1. Intercostal pain, chronic s/p lobectomy  Amb External Referral To Pain Clinic   2. Intercostal neuralgia  Amb External Referral To Pain Clinic   3. Simple chronic bronchitis ([facility] [address]     4. Cough  promethazine-dextromethorphan (PROMETHAZINE-DM) 6.25-15 MG/5ML syrup   5.  Lung nodules, right lung increase size 8 mm             Plan:       ED report and CTA scan reviewed  Repeat CT in May 2018 for nodules  Clinically improving - continue and finish out medicine  Continue NEB  Smoking cessation  Refer to [facility]  [facility]

## 2017-11-21 RX ORDER — BENZONATATE 100 MG/1
CAPSULE ORAL
Qty: 30 CAPSULE | Refills: 1 | Status: SHIPPED | OUTPATIENT
Start: 2017-11-21 | End: 2019-06-24 | Stop reason: ALTCHOICE

## 2018-03-19 ENCOUNTER — TELEPHONE (OUTPATIENT)
Dept: FAMILY MEDICINE CLINIC | Age: 48
End: 2018-03-19

## 2018-03-19 DIAGNOSIS — R05.9 COUGH: Primary | ICD-10-CM

## 2018-03-19 RX ORDER — PROMETHAZINE HYDROCHLORIDE AND PHENYLEPHRINE HYDROCHLORIDE 6.25; 5 MG/5ML; MG/5ML
5 SYRUP ORAL EVERY 6 HOURS
Qty: 240 ML | Refills: 0 | Status: SHIPPED | OUTPATIENT
Start: 2018-03-19 | End: 2018-12-14

## 2018-03-23 ENCOUNTER — OFFICE VISIT (OUTPATIENT)
Dept: FAMILY MEDICINE CLINIC | Age: 48
End: 2018-03-23
Payer: MEDICARE

## 2018-03-23 VITALS
HEIGHT: 76 IN | WEIGHT: 171.7 LBS | RESPIRATION RATE: 16 BRPM | BODY MASS INDEX: 20.91 KG/M2 | SYSTOLIC BLOOD PRESSURE: 114 MMHG | HEART RATE: 58 BPM | OXYGEN SATURATION: 98 % | TEMPERATURE: 98.1 F | DIASTOLIC BLOOD PRESSURE: 60 MMHG

## 2018-03-23 DIAGNOSIS — Z72.0 TOBACCO ABUSE: ICD-10-CM

## 2018-03-23 DIAGNOSIS — J41.0 SIMPLE CHRONIC BRONCHITIS (HCC): ICD-10-CM

## 2018-03-23 DIAGNOSIS — J01.90 ACUTE BACTERIAL SINUSITIS: Primary | ICD-10-CM

## 2018-03-23 DIAGNOSIS — B96.89 ACUTE BACTERIAL SINUSITIS: Primary | ICD-10-CM

## 2018-03-23 PROCEDURE — 99213 OFFICE O/P EST LOW 20 MIN: CPT | Performed by: NURSE PRACTITIONER

## 2018-03-23 PROCEDURE — G8484 FLU IMMUNIZE NO ADMIN: HCPCS | Performed by: NURSE PRACTITIONER

## 2018-03-23 PROCEDURE — G8926 SPIRO NO PERF OR DOC: HCPCS | Performed by: NURSE PRACTITIONER

## 2018-03-23 PROCEDURE — 4004F PT TOBACCO SCREEN RCVD TLK: CPT | Performed by: NURSE PRACTITIONER

## 2018-03-23 PROCEDURE — G8420 CALC BMI NORM PARAMETERS: HCPCS | Performed by: NURSE PRACTITIONER

## 2018-03-23 PROCEDURE — G8427 DOCREV CUR MEDS BY ELIG CLIN: HCPCS | Performed by: NURSE PRACTITIONER

## 2018-03-23 PROCEDURE — 3023F SPIROM DOC REV: CPT | Performed by: NURSE PRACTITIONER

## 2018-03-23 RX ORDER — PSEUDOEPHEDRINE HCL 120 MG/1
120 TABLET, FILM COATED, EXTENDED RELEASE ORAL EVERY 12 HOURS
Qty: 14 TABLET | Refills: 0 | Status: SHIPPED | OUTPATIENT
Start: 2018-03-23 | End: 2018-03-30

## 2018-03-23 RX ORDER — DOXYCYCLINE HYCLATE 100 MG
100 TABLET ORAL 2 TIMES DAILY
Qty: 20 TABLET | Refills: 0 | Status: SHIPPED | OUTPATIENT
Start: 2018-03-23 | End: 2018-04-02

## 2018-03-23 RX ORDER — GABAPENTIN 300 MG/1
300 CAPSULE ORAL 3 TIMES DAILY
COMMUNITY
End: 2018-09-07 | Stop reason: SDUPTHER

## 2018-03-23 ASSESSMENT — ENCOUNTER SYMPTOMS
RHINORRHEA: 1
NAUSEA: 0
ABDOMINAL PAIN: 0
COUGH: 1
SHORTNESS OF BREATH: 0
SORE THROAT: 1

## 2018-03-23 ASSESSMENT — PATIENT HEALTH QUESTIONNAIRE - PHQ9
2. FEELING DOWN, DEPRESSED OR HOPELESS: 0
SUM OF ALL RESPONSES TO PHQ QUESTIONS 1-9: 0
1. LITTLE INTEREST OR PLEASURE IN DOING THINGS: 0
SUM OF ALL RESPONSES TO PHQ9 QUESTIONS 1 & 2: 0

## 2018-03-23 NOTE — PROGRESS NOTES
Visit Information    Have you changed or started any medications since your last visit including any over-the-counter medicines, vitamins, or herbal medicines? no   Have you stopped taking any of your medications? Is so, why? -  no  Are you having any side effects from any of your medications? - no    Have you seen any other physician or provider since your last visit? yes - Dr Marie Farias [facility]  Have you had any other diagnostic tests since your last visit?  no   Have you been seen in the emergency room and/or had an admission in a hospital since we last saw you?  no   Have you had your routine dental cleaning in the past 6 months?  no no teeth    Do you have an active MyChart account? If no, what is the barrier?   Yes    Patient Care Team:  Martinez Palacios NP as PCP - General (Certified Nurse Practitioner)    Medical History Review  Past Medical, Family, and Social History reviewed and does not contribute to the patient presenting condition    Health Maintenance   Topic Date Due    HIV screen  07/08/1983    DTaP/Tdap/Td vaccine (1 - Tdap) 07/08/1987    Pneumococcal med risk (1 of 1 - PPSV23) 07/08/1987    Lipid screen  07/08/2008    Flu vaccine (1) 09/01/2017

## 2018-03-23 NOTE — PROGRESS NOTES
Subjective:      Patient ID: Michelle Be is a 50 y.o. male. HPI: Acute for cough    Chief Complaint   Patient presents with    Cough     chest hurts when cough, yellow/green mucous  chills   onset 1.5 week       Onset of 1-2 weeks with cough and sinus congestion. Cough that is productive. Denies CP, SOB or chest tightness. Smokes    OTC: Cough and Cold    Vitals:    03/23/18 0826   BP: 114/60   Pulse: 58   Resp: 16   Temp: 98.1 °F (36.7 °C)   SpO2: 98%       Patient Active Problem List   Diagnosis    Left inguinal hernia    Simple chronic bronchitis (HCC)    Chest wall pain following surgery    Empyema lung (HCC)    S/P lobectomy of lung    Histoplasmosis    Depression with anxiety    Intercostal neuralgia       Review of Systems   Constitutional: Positive for chills and fatigue. Negative for fever. HENT: Positive for congestion, postnasal drip, rhinorrhea and sore throat. Respiratory: Positive for cough. Negative for shortness of breath. Cardiovascular: Negative for chest pain. Gastrointestinal: Negative for abdominal pain and nausea. Skin: Negative for rash. Neurological: Negative for dizziness, light-headedness and headaches. Psychiatric/Behavioral: Negative. Objective:   Physical Exam   Constitutional: He is oriented to person, place, and time. Vital signs are normal. He appears well-developed and well-nourished. He is active. He does not have a sickly appearance. No distress. HENT:   Right Ear: Tympanic membrane normal.   Left Ear: Tympanic membrane normal.   Nose: Mucosal edema and rhinorrhea present. Right sinus exhibits frontal sinus tenderness. Left sinus exhibits frontal sinus tenderness. Mouth/Throat: Mucous membranes are normal. Posterior oropharyngeal edema present. Cardiovascular: Normal rate, regular rhythm, S1 normal, S2 normal, normal heart sounds and normal pulses. Exam reveals no S3. No murmur heard.   Pulmonary/Chest: Effort normal. He has

## 2018-03-23 NOTE — PATIENT INSTRUCTIONS
exercise. Having healthy habits will help your body move past its craving for nicotine. · Be prepared to keep trying. Most people are not successful the first few times they try to quit. Do not get mad at yourself if you smoke again. Make a list of things you learned and think about when you want to try again, such as next week, next month, or next year. Where can you learn more? Go to [email or web address].[email or web address] org and sign in to your basestone account. Enter S913 in the REEL Qualified box to learn more about \"Stopping Smoking: Care Instructions. \"     If you do not have an account, please click on the \"Sign Up Now\" link. Current as of: March 20, 2017  Content Version: 11.5  © 1721-0420 Healthwise, Incorporated. Care instructions adapted under license by [facility] [address] If you have questions about a medical condition or this instruction, always ask your healthcare professional. [place] [address] any warranty or liability for your use of this information.

## 2018-05-07 ENCOUNTER — HOSPITAL ENCOUNTER (EMERGENCY)
Age: 48
Discharge: HOME | End: 2018-05-07

## 2018-05-07 ENCOUNTER — APPOINTMENT (OUTPATIENT)
Dept: GENERAL RADIOLOGY | Age: 48
End: 2018-05-07

## 2018-05-07 VITALS
TEMPERATURE: 97.6 F | RESPIRATION RATE: 16 BRPM | SYSTOLIC BLOOD PRESSURE: 163 MMHG | HEIGHT: 76 IN | OXYGEN SATURATION: 100 % | BODY MASS INDEX: 21.31 KG/M2 | WEIGHT: 175 LBS | DIASTOLIC BLOOD PRESSURE: 83 MMHG | HEART RATE: 40 BPM

## 2018-05-07 DIAGNOSIS — S39.012A STRAIN OF LUMBAR REGION, INITIAL ENCOUNTER: Primary | ICD-10-CM

## 2018-05-07 PROCEDURE — 96372 THER/PROPH/DIAG INJ SC/IM: CPT

## 2018-05-07 PROCEDURE — 6370000000 HC RX 637 (ALT 250 FOR IP): Performed by: STUDENT IN AN ORGANIZED HEALTH CARE EDUCATION/TRAINING PROGRAM

## 2018-05-07 PROCEDURE — 6360000002 HC RX W HCPCS: Performed by: STUDENT IN AN ORGANIZED HEALTH CARE EDUCATION/TRAINING PROGRAM

## 2018-05-07 PROCEDURE — 72100 X-RAY EXAM L-S SPINE 2/3 VWS: CPT

## 2018-05-07 PROCEDURE — 99283 EMERGENCY DEPT VISIT LOW MDM: CPT

## 2018-05-07 RX ORDER — CYCLOBENZAPRINE HYDROCHLORIDE 7.5 MG/1
7.5 TABLET, FILM COATED ORAL 3 TIMES DAILY PRN
Qty: 30 TABLET | Refills: 0 | Status: SHIPPED | OUTPATIENT
Start: 2018-05-07 | End: 2018-05-17

## 2018-05-07 RX ORDER — KETOROLAC TROMETHAMINE 30 MG/ML
30 INJECTION, SOLUTION INTRAMUSCULAR; INTRAVENOUS ONCE
Status: COMPLETED | OUTPATIENT
Start: 2018-05-07 | End: 2018-05-07

## 2018-05-07 RX ORDER — ORPHENADRINE CITRATE 30 MG/ML
60 INJECTION INTRAMUSCULAR; INTRAVENOUS ONCE
Status: COMPLETED | OUTPATIENT
Start: 2018-05-07 | End: 2018-05-07

## 2018-05-07 RX ORDER — LIDOCAINE 50 MG/G
1 PATCH TOPICAL ONCE
Status: DISCONTINUED | OUTPATIENT
Start: 2018-05-07 | End: 2018-05-07 | Stop reason: HOSPADM

## 2018-05-07 RX ORDER — NAPROXEN 500 MG/1
500 TABLET ORAL 2 TIMES DAILY WITH MEALS
Qty: 30 TABLET | Refills: 0 | Status: SHIPPED | OUTPATIENT
Start: 2018-05-07 | End: 2018-12-14

## 2018-05-07 RX ADMIN — ORPHENADRINE CITRATE 60 MG: 30 INJECTION INTRAMUSCULAR; INTRAVENOUS at 13:45

## 2018-05-07 RX ADMIN — KETOROLAC TROMETHAMINE 30 MG: 30 INJECTION, SOLUTION INTRAMUSCULAR at 13:44

## 2018-05-07 ASSESSMENT — ENCOUNTER SYMPTOMS
EYE REDNESS: 0
SORE THROAT: 0
EYE DISCHARGE: 0
DIARRHEA: 0
WHEEZING: 0
CHOKING: 0
APNEA: 0
ABDOMINAL PAIN: 0
STRIDOR: 0
NAUSEA: 0
VOMITING: 0
SHORTNESS OF BREATH: 1
RHINORRHEA: 0
BACK PAIN: 1
CHEST TIGHTNESS: 0
COUGH: 0

## 2018-05-07 ASSESSMENT — PAIN DESCRIPTION - DESCRIPTORS: DESCRIPTORS: STABBING;SHARP

## 2018-05-07 ASSESSMENT — PAIN SCALES - GENERAL
PAINLEVEL_OUTOF10: 10
PAINLEVEL_OUTOF10: 10

## 2018-05-07 ASSESSMENT — PAIN DESCRIPTION - LOCATION: LOCATION: BACK

## 2018-05-07 ASSESSMENT — PAIN DESCRIPTION - ORIENTATION: ORIENTATION: LOWER

## 2018-05-07 ASSESSMENT — PAIN DESCRIPTION - PAIN TYPE: TYPE: ACUTE PAIN

## 2018-05-07 ASSESSMENT — PAIN DESCRIPTION - FREQUENCY: FREQUENCY: CONTINUOUS

## 2018-09-07 DIAGNOSIS — G89.4 CHRONIC PAIN SYNDROME: ICD-10-CM

## 2018-09-07 DIAGNOSIS — G58.8 INTERCOSTAL NEURALGIA: ICD-10-CM

## 2018-09-07 DIAGNOSIS — G89.29 CHRONIC LEFT-SIDED THORACIC BACK PAIN: ICD-10-CM

## 2018-09-07 DIAGNOSIS — Z90.2 S/P LOBECTOMY OF LUNG: ICD-10-CM

## 2018-09-07 DIAGNOSIS — M54.6 CHRONIC LEFT-SIDED THORACIC BACK PAIN: ICD-10-CM

## 2018-09-07 DIAGNOSIS — R07.81 RIB PAIN ON LEFT SIDE: ICD-10-CM

## 2018-09-07 RX ORDER — GABAPENTIN 300 MG/1
300 CAPSULE ORAL 3 TIMES DAILY
Qty: 90 CAPSULE | Refills: 2 | Status: SHIPPED | OUTPATIENT
Start: 2018-09-07 | End: 2018-10-25 | Stop reason: ALTCHOICE

## 2018-09-07 NOTE — TELEPHONE ENCOUNTER
Mamie Irwin called requesting a refill on the following medications:  Requested Prescriptions     Pending Prescriptions Disp Refills    gabapentin (NEURONTIN) 300 MG capsule 90 capsule      Sig: Take 1 capsule by mouth 3 times daily. Jhony Diaz      Pharmacy verified:  .[facility]    Date of last visit: 03/23/18  Date of next visit (if applicable): Visit date not found

## 2018-10-25 ENCOUNTER — TELEPHONE (OUTPATIENT)
Dept: FAMILY MEDICINE CLINIC | Age: 48
End: 2018-10-25

## 2018-10-25 DIAGNOSIS — G58.8 INTERCOSTAL NEURALGIA: Primary | ICD-10-CM

## 2018-10-25 RX ORDER — GABAPENTIN 600 MG/1
600 TABLET ORAL 3 TIMES DAILY
Qty: 90 TABLET | Refills: 1 | Status: SHIPPED | OUTPATIENT
Start: 2018-10-25 | End: 2019-02-06 | Stop reason: ALTCHOICE

## 2018-12-11 ENCOUNTER — TELEPHONE (OUTPATIENT)
Dept: FAMILY MEDICINE CLINIC | Age: 48
End: 2018-12-11

## 2018-12-14 ENCOUNTER — OFFICE VISIT (OUTPATIENT)
Dept: FAMILY MEDICINE CLINIC | Age: 48
End: 2018-12-14
Payer: MEDICARE

## 2018-12-14 VITALS
DIASTOLIC BLOOD PRESSURE: 72 MMHG | HEIGHT: 76 IN | HEART RATE: 51 BPM | RESPIRATION RATE: 12 BRPM | SYSTOLIC BLOOD PRESSURE: 134 MMHG | TEMPERATURE: 97.6 F | WEIGHT: 160.2 LBS | BODY MASS INDEX: 19.51 KG/M2

## 2018-12-14 DIAGNOSIS — G58.8 INTERCOSTAL NEURALGIA: ICD-10-CM

## 2018-12-14 DIAGNOSIS — J44.1 COPD EXACERBATION (HCC): Primary | ICD-10-CM

## 2018-12-14 DIAGNOSIS — J41.0 SIMPLE CHRONIC BRONCHITIS (HCC): ICD-10-CM

## 2018-12-14 DIAGNOSIS — Z13.220 SCREENING FOR HYPERLIPIDEMIA: ICD-10-CM

## 2018-12-14 PROCEDURE — G8427 DOCREV CUR MEDS BY ELIG CLIN: HCPCS | Performed by: NURSE PRACTITIONER

## 2018-12-14 PROCEDURE — G8484 FLU IMMUNIZE NO ADMIN: HCPCS | Performed by: NURSE PRACTITIONER

## 2018-12-14 PROCEDURE — G8926 SPIRO NO PERF OR DOC: HCPCS | Performed by: NURSE PRACTITIONER

## 2018-12-14 PROCEDURE — 3017F COLORECTAL CA SCREEN DOC REV: CPT | Performed by: NURSE PRACTITIONER

## 2018-12-14 PROCEDURE — G8420 CALC BMI NORM PARAMETERS: HCPCS | Performed by: NURSE PRACTITIONER

## 2018-12-14 PROCEDURE — 99214 OFFICE O/P EST MOD 30 MIN: CPT | Performed by: NURSE PRACTITIONER

## 2018-12-14 PROCEDURE — 3023F SPIROM DOC REV: CPT | Performed by: NURSE PRACTITIONER

## 2018-12-14 PROCEDURE — 4004F PT TOBACCO SCREEN RCVD TLK: CPT | Performed by: NURSE PRACTITIONER

## 2018-12-14 RX ORDER — PREDNISONE 50 MG/1
50 TABLET ORAL DAILY
Qty: 5 TABLET | Refills: 0 | Status: SHIPPED | OUTPATIENT
Start: 2018-12-14 | End: 2018-12-19

## 2018-12-14 RX ORDER — FLUTICASONE PROPIONATE AND SALMETEROL 232; 14 UG/1; UG/1
1 POWDER, METERED RESPIRATORY (INHALATION) 2 TIMES DAILY
Qty: 1 EACH | Refills: 11 | Status: SHIPPED | OUTPATIENT
Start: 2018-12-14 | End: 2018-12-18 | Stop reason: ALTCHOICE

## 2018-12-14 RX ORDER — BENZONATATE 100 MG/1
100-200 CAPSULE ORAL 3 TIMES DAILY PRN
Qty: 60 CAPSULE | Refills: 0 | Status: SHIPPED | OUTPATIENT
Start: 2018-12-14 | End: 2018-12-21

## 2018-12-14 RX ORDER — GABAPENTIN 800 MG/1
800 TABLET ORAL 3 TIMES DAILY
Qty: 90 TABLET | Refills: 2 | Status: SHIPPED | OUTPATIENT
Start: 2018-12-14 | End: 2019-02-06 | Stop reason: SDUPTHER

## 2018-12-14 ASSESSMENT — ENCOUNTER SYMPTOMS
SHORTNESS OF BREATH: 0
RHINORRHEA: 1
ABDOMINAL PAIN: 0
CHEST TIGHTNESS: 1
WHEEZING: 1
COUGH: 1
NAUSEA: 0

## 2018-12-14 NOTE — PROGRESS NOTES
muscle usage. No tachypnea. No respiratory distress. He has decreased breath sounds. He has wheezes in the right upper field and the left upper field. He has no rhonchi. He exhibits tenderness and bony tenderness (left rib cage lateral and anterior). He exhibits no crepitus. Abdominal: Soft. Bowel sounds are normal. There is no tenderness. Neurological: He is alert and oriented to person, place, and time. Psychiatric: His speech is normal. Judgment normal. His mood appears not anxious. He is not slowed and not withdrawn. Thought content is not paranoid. Cognition and memory are normal. He does not exhibit a depressed mood. Assessment:       Diagnosis Orders   1. COPD exacerbation (HCC)  predniSONE (DELTASONE) 50 MG tablet    benzonatate (TESSALON) 100 MG capsule   2. Simple chronic bronchitis (HCC)  Fluticasone-Salmeterol (AIRDUO RESPICLICK 222/17) 037-67 MCG/ACT AEPB   3. Intercostal neuralgia  gabapentin (NEURONTIN) 800 MG tablet   4.  Screening for hyperlipidemia  CBC    Lipid Panel    Comprehensive Metabolic Panel    Hemoglobin A1C           Plan:      Prednisone Burst + Cough Pearls  Rest and fluids  Stress Daily use of his maintenance inhaler  Increase Gabapentin 800 mg TID  Screening Labs  Cologaurd Form completed  RTO PRN        Elpidio Ortiz, APRN - CNP

## 2018-12-17 ENCOUNTER — TELEPHONE (OUTPATIENT)
Dept: FAMILY MEDICINE CLINIC | Age: 48
End: 2018-12-17

## 2018-12-17 DIAGNOSIS — J41.0 SIMPLE CHRONIC BRONCHITIS (HCC): Primary | ICD-10-CM

## 2019-02-06 ENCOUNTER — TELEPHONE (OUTPATIENT)
Dept: FAMILY MEDICINE CLINIC | Age: 49
End: 2019-02-06

## 2019-02-06 DIAGNOSIS — G58.8 INTERCOSTAL NEURALGIA: ICD-10-CM

## 2019-02-06 RX ORDER — GABAPENTIN 800 MG/1
800 TABLET ORAL 3 TIMES DAILY
Qty: 90 TABLET | Refills: 5 | Status: SHIPPED | OUTPATIENT
Start: 2019-02-06 | End: 2019-06-24 | Stop reason: SDUPTHER

## 2019-05-21 DIAGNOSIS — J41.0 SIMPLE CHRONIC BRONCHITIS (HCC): ICD-10-CM

## 2019-05-21 RX ORDER — ALBUTEROL SULFATE 90 UG/1
2 AEROSOL, METERED RESPIRATORY (INHALATION) EVERY 6 HOURS PRN
Qty: 1 INHALER | Refills: 2 | Status: SHIPPED | OUTPATIENT
Start: 2019-05-21 | End: 2019-06-24 | Stop reason: SDUPTHER

## 2019-06-24 ENCOUNTER — OFFICE VISIT (OUTPATIENT)
Dept: FAMILY MEDICINE CLINIC | Age: 49
End: 2019-06-24
Payer: MEDICARE

## 2019-06-24 VITALS
HEART RATE: 60 BPM | BODY MASS INDEX: 19.7 KG/M2 | DIASTOLIC BLOOD PRESSURE: 72 MMHG | WEIGHT: 161.8 LBS | OXYGEN SATURATION: 98 % | SYSTOLIC BLOOD PRESSURE: 112 MMHG | RESPIRATION RATE: 20 BRPM | HEIGHT: 76 IN

## 2019-06-24 DIAGNOSIS — G58.0 INTRACTABLE INTERCOSTAL NEUROPATHIC PAIN: ICD-10-CM

## 2019-06-24 DIAGNOSIS — G58.8 INTERCOSTAL NEURALGIA: Primary | ICD-10-CM

## 2019-06-24 DIAGNOSIS — Z13.220 SCREENING FOR HYPERLIPIDEMIA: ICD-10-CM

## 2019-06-24 DIAGNOSIS — F17.200 SMOKING: ICD-10-CM

## 2019-06-24 DIAGNOSIS — J41.0 SIMPLE CHRONIC BRONCHITIS (HCC): ICD-10-CM

## 2019-06-24 DIAGNOSIS — Z90.2 S/P LOBECTOMY OF LUNG: ICD-10-CM

## 2019-06-24 DIAGNOSIS — Z13.1 SCREENING FOR DIABETES MELLITUS (DM): ICD-10-CM

## 2019-06-24 PROCEDURE — 3017F COLORECTAL CA SCREEN DOC REV: CPT | Performed by: NURSE PRACTITIONER

## 2019-06-24 PROCEDURE — G8427 DOCREV CUR MEDS BY ELIG CLIN: HCPCS | Performed by: NURSE PRACTITIONER

## 2019-06-24 PROCEDURE — 99214 OFFICE O/P EST MOD 30 MIN: CPT | Performed by: NURSE PRACTITIONER

## 2019-06-24 PROCEDURE — 4004F PT TOBACCO SCREEN RCVD TLK: CPT | Performed by: NURSE PRACTITIONER

## 2019-06-24 PROCEDURE — G8926 SPIRO NO PERF OR DOC: HCPCS | Performed by: NURSE PRACTITIONER

## 2019-06-24 PROCEDURE — 3023F SPIROM DOC REV: CPT | Performed by: NURSE PRACTITIONER

## 2019-06-24 PROCEDURE — G8420 CALC BMI NORM PARAMETERS: HCPCS | Performed by: NURSE PRACTITIONER

## 2019-06-24 RX ORDER — GABAPENTIN 800 MG/1
800 TABLET ORAL 3 TIMES DAILY
Qty: 90 TABLET | Refills: 5 | Status: SHIPPED | OUTPATIENT
Start: 2019-06-24 | End: 2019-12-09 | Stop reason: SDUPTHER

## 2019-06-24 RX ORDER — ALBUTEROL SULFATE 90 UG/1
2 AEROSOL, METERED RESPIRATORY (INHALATION) EVERY 6 HOURS PRN
Qty: 1 INHALER | Refills: 2 | Status: SHIPPED | OUTPATIENT
Start: 2019-06-24 | End: 2019-09-19 | Stop reason: SDUPTHER

## 2019-06-24 ASSESSMENT — ENCOUNTER SYMPTOMS
NAUSEA: 0
ABDOMINAL PAIN: 0
SHORTNESS OF BREATH: 0
COUGH: 1
WHEEZING: 0
RHINORRHEA: 0
CHEST TIGHTNESS: 1

## 2019-06-24 ASSESSMENT — PATIENT HEALTH QUESTIONNAIRE - PHQ9
SUM OF ALL RESPONSES TO PHQ QUESTIONS 1-9: 1
1. LITTLE INTEREST OR PLEASURE IN DOING THINGS: 0
2. FEELING DOWN, DEPRESSED OR HOPELESS: 1
SUM OF ALL RESPONSES TO PHQ9 QUESTIONS 1 & 2: 1
SUM OF ALL RESPONSES TO PHQ QUESTIONS 1-9: 1

## 2019-06-24 NOTE — PROGRESS NOTES
Subjective:      Patient ID: Duke Kirby is a 46 y.o. male. HPI  : Follow Up    Chief Complaint   Patient presents with    Annual Exam    Medication Refill    Forms     Disability forms       Patient Active Problem List   Diagnosis    Left inguinal hernia    Simple chronic bronchitis (HCC)    Chest wall pain following surgery    [facility] lung ([facility] [address]    S/P lobectomy of lung    Histoplasmosis    Depression with anxiety    Intercostal neuralgia     COLONOSCOPY - due    Hx of seeing [facility]. Related to chronic intercostal pain s/p lobectomy in 2016. Was on Norco and Gabapentin.  Had intercostal INJ with no benefits.  Gabapentin was dosed up and he is noticing good benefit of his pain. Denies pain with coughing anymore. He is on Gabapentin 800 mg TID. No longer on [address]. 1 ppd smoking. On Dulera BID. Use of NEB tx 2-3 times per day. Mood stable. No longer on Wellbutrin. Feeling well. Overall doing well. BP Readings from Last 3 Encounters:   06/24/19 112/72   12/14/18 134/72   05/07/18 (!) 163/83     BP wnl.      Due for annual labs    No results found for: LABA1C  No results found for: EAG    No components found for: CHLPL  No results found for: TRIG  No results found for: HDL  No results found for: LDLCALC  No results found for: LABVLDL      Chemistry        Component Value Date/Time     11/16/2017 1504    K 4.5 11/16/2017 1504     11/16/2017 1504    CO2 31 11/16/2017 1504    BUN 14 11/16/2017 1504    CREATININE 1.1 11/16/2017 1504        Component Value Date/Time    CALCIUM 9.6 11/16/2017 1504    ALKPHOS 83 11/16/2017 1504    AST 21 11/16/2017 1504    ALT 14 11/16/2017 1504    BILITOT 0.5 11/16/2017 1504            No results found for: TSH, U3VLTQB, I9WSFIG, THYROIDAB    Lab Results   Component Value Date    WBC 8.7 11/16/2017    HGB 14.5 11/16/2017    HCT 41.8 (L) 11/16/2017    MCV 84.9 11/16/2017     (H) 11/16/2017         Health Maintenance   Topic Date Due  Pneumococcal 0-64 years Vaccine (1 of 1 - PPSV23) 07/08/1974    HIV screen  07/08/1983    DTaP/Tdap/Td vaccine (1 - Tdap) 07/08/1987    Lipid screen  07/08/2008    Shingles Vaccine (1 of 2) 07/08/2018    Colon cancer screen colonoscopy  07/08/2018    Flu vaccine (Season Ended) 09/01/2019       There is no immunization history for the selected administration types on file for this patient. Review of Systems   Constitutional: Negative for chills and fever. HENT: Negative for congestion, postnasal drip and rhinorrhea. Respiratory: Positive for cough and chest tightness. Negative for shortness of breath and wheezing. Cardiovascular: Positive for chest pain (rib pain). Gastrointestinal: Negative for abdominal pain and nausea. Musculoskeletal: Positive for arthralgias. Skin: Negative for rash. Neurological: Negative for dizziness, light-headedness and headaches. Psychiatric/Behavioral: Negative. Objective:   Physical Exam   Constitutional: He is oriented to person, place, and time. Vital signs are normal. He appears well-developed and well-nourished. He is active. He does not have a sickly appearance. No distress. HENT:   Right Ear: Tympanic membrane normal.   Left Ear: Tympanic membrane normal.   Nose: Mucosal edema and rhinorrhea present. Mouth/Throat: Mucous membranes are normal. Posterior oropharyngeal edema present. No posterior oropharyngeal erythema. Cardiovascular: Normal rate, regular rhythm, S1 normal, S2 normal, normal heart sounds and normal pulses. Exam reveals no S3. No murmur heard. Pulmonary/Chest: Effort normal. No accessory muscle usage. No tachypnea. No respiratory distress. He has decreased breath sounds. He has no wheezes. He has no rhonchi. He exhibits tenderness and bony tenderness (left rib cage lateral and anterior). He exhibits no crepitus. Abdominal: Soft. Bowel sounds are normal. There is no tenderness.    Neurological: He is alert and oriented to person, place, and time. Psychiatric: His speech is normal. Judgment normal. His mood appears not anxious. He is not slowed and not withdrawn. Thought content is not paranoid. Cognition and memory are normal. He does not exhibit a depressed mood. Assessment:       Diagnosis Orders   1. Intercostal neuralgia  gabapentin (NEURONTIN) 800 MG tablet   2. Intractable intercostal neuropathic pain     3. Simple chronic bronchitis (HCC)  albuterol sulfate  (90 Base) MCG/ACT inhaler    mometasone-formoterol (DULERA) 200-5 MCG/ACT inhaler    tiotropium (SPIRIVA RESPIMAT) 2.5 MCG/ACT AERS inhaler   4. S/P lobectomy of lung     5. Smoking     6. Screening for hyperlipidemia  Lipid Panel    CBC   7.  Screening for diabetes mellitus (DM)  Hemoglobin A1C    Comprehensive Metabolic Panel           Plan:      Chronic conditions stable  Labs reviewed  Refills as above   - add on Spriva  Screening Labs  Cologuard Form completed  Disability Form completed   RTO in 6 months        LILIA Burnham - CNP

## 2019-09-19 DIAGNOSIS — J41.0 SIMPLE CHRONIC BRONCHITIS (HCC): ICD-10-CM

## 2019-09-19 RX ORDER — ALBUTEROL SULFATE 90 UG/1
2 AEROSOL, METERED RESPIRATORY (INHALATION) EVERY 6 HOURS PRN
Qty: 1 INHALER | Refills: 2 | Status: SHIPPED | OUTPATIENT
Start: 2019-09-19 | End: 2019-12-11 | Stop reason: SDUPTHER

## 2019-12-09 ENCOUNTER — TELEPHONE (OUTPATIENT)
Dept: FAMILY MEDICINE CLINIC | Age: 49
End: 2019-12-09

## 2019-12-09 DIAGNOSIS — G58.8 INTERCOSTAL NEURALGIA: ICD-10-CM

## 2019-12-09 RX ORDER — GABAPENTIN 800 MG/1
800 TABLET ORAL 4 TIMES DAILY
Qty: 120 TABLET | Refills: 5 | Status: SHIPPED | OUTPATIENT
Start: 2019-12-09 | End: 2020-05-27 | Stop reason: SDUPTHER

## 2019-12-11 DIAGNOSIS — J41.0 SIMPLE CHRONIC BRONCHITIS (HCC): ICD-10-CM

## 2019-12-11 RX ORDER — ALBUTEROL SULFATE 90 UG/1
2 AEROSOL, METERED RESPIRATORY (INHALATION) EVERY 6 HOURS PRN
Qty: 1 INHALER | Refills: 2 | Status: SHIPPED | OUTPATIENT
Start: 2019-12-11 | End: 2020-05-27 | Stop reason: SDUPTHER

## 2019-12-17 ENCOUNTER — TELEPHONE (OUTPATIENT)
Dept: FAMILY MEDICINE CLINIC | Age: 49
End: 2019-12-17

## 2019-12-30 ENCOUNTER — OFFICE VISIT (OUTPATIENT)
Dept: FAMILY MEDICINE CLINIC | Age: 49
End: 2019-12-30
Payer: MEDICARE

## 2019-12-30 VITALS
HEART RATE: 76 BPM | RESPIRATION RATE: 20 BRPM | OXYGEN SATURATION: 99 % | BODY MASS INDEX: 20.02 KG/M2 | HEIGHT: 76 IN | DIASTOLIC BLOOD PRESSURE: 68 MMHG | SYSTOLIC BLOOD PRESSURE: 128 MMHG | WEIGHT: 164.4 LBS

## 2019-12-30 DIAGNOSIS — Z12.12 SCREENING FOR COLORECTAL CANCER: ICD-10-CM

## 2019-12-30 DIAGNOSIS — J41.0 SIMPLE CHRONIC BRONCHITIS (HCC): ICD-10-CM

## 2019-12-30 DIAGNOSIS — R07.81 RIB PAIN ON LEFT SIDE: Primary | ICD-10-CM

## 2019-12-30 DIAGNOSIS — G58.8 INTERCOSTAL NEURALGIA: ICD-10-CM

## 2019-12-30 DIAGNOSIS — Z90.2 S/P LOBECTOMY OF LUNG: ICD-10-CM

## 2019-12-30 DIAGNOSIS — Z12.11 SCREENING FOR COLORECTAL CANCER: ICD-10-CM

## 2019-12-30 DIAGNOSIS — F17.200 SMOKING: ICD-10-CM

## 2019-12-30 PROCEDURE — 4004F PT TOBACCO SCREEN RCVD TLK: CPT | Performed by: NURSE PRACTITIONER

## 2019-12-30 PROCEDURE — 3017F COLORECTAL CA SCREEN DOC REV: CPT | Performed by: NURSE PRACTITIONER

## 2019-12-30 PROCEDURE — G8926 SPIRO NO PERF OR DOC: HCPCS | Performed by: NURSE PRACTITIONER

## 2019-12-30 PROCEDURE — G8427 DOCREV CUR MEDS BY ELIG CLIN: HCPCS | Performed by: NURSE PRACTITIONER

## 2019-12-30 PROCEDURE — 3023F SPIROM DOC REV: CPT | Performed by: NURSE PRACTITIONER

## 2019-12-30 PROCEDURE — G8484 FLU IMMUNIZE NO ADMIN: HCPCS | Performed by: NURSE PRACTITIONER

## 2019-12-30 PROCEDURE — 99214 OFFICE O/P EST MOD 30 MIN: CPT | Performed by: NURSE PRACTITIONER

## 2019-12-30 PROCEDURE — G8420 CALC BMI NORM PARAMETERS: HCPCS | Performed by: NURSE PRACTITIONER

## 2019-12-30 RX ORDER — BACLOFEN 20 MG/1
20 TABLET ORAL 3 TIMES DAILY
Qty: 15 TABLET | Refills: 0 | Status: SHIPPED | OUTPATIENT
Start: 2019-12-30 | End: 2021-01-04

## 2019-12-30 SDOH — ECONOMIC STABILITY: INCOME INSECURITY: HOW HARD IS IT FOR YOU TO PAY FOR THE VERY BASICS LIKE FOOD, HOUSING, MEDICAL CARE, AND HEATING?: VERY HARD

## 2019-12-30 SDOH — ECONOMIC STABILITY: FOOD INSECURITY: WITHIN THE PAST 12 MONTHS, THE FOOD YOU BOUGHT JUST DIDN'T LAST AND YOU DIDN'T HAVE MONEY TO GET MORE.: SOMETIMES TRUE

## 2019-12-30 SDOH — ECONOMIC STABILITY: FOOD INSECURITY: WITHIN THE PAST 12 MONTHS, YOU WORRIED THAT YOUR FOOD WOULD RUN OUT BEFORE YOU GOT MONEY TO BUY MORE.: SOMETIMES TRUE

## 2019-12-30 SDOH — ECONOMIC STABILITY: TRANSPORTATION INSECURITY
IN THE PAST 12 MONTHS, HAS LACK OF TRANSPORTATION KEPT YOU FROM MEETINGS, WORK, OR FROM GETTING THINGS NEEDED FOR DAILY LIVING?: YES

## 2019-12-30 SDOH — ECONOMIC STABILITY: TRANSPORTATION INSECURITY
IN THE PAST 12 MONTHS, HAS THE LACK OF TRANSPORTATION KEPT YOU FROM MEDICAL APPOINTMENTS OR FROM GETTING MEDICATIONS?: NO

## 2019-12-30 ASSESSMENT — ENCOUNTER SYMPTOMS
CHEST TIGHTNESS: 1
RHINORRHEA: 0
WHEEZING: 0

## 2020-03-03 ENCOUNTER — TELEPHONE (OUTPATIENT)
Dept: FAMILY MEDICINE CLINIC | Age: 50
End: 2020-03-03

## 2020-03-03 NOTE — TELEPHONE ENCOUNTER
Fax received from [facility] stating that the patient has not returned his Cologuard. Attempted to contact the patient, his mobil number is no longer in service, the home number rang with no answer and no VM. Will try again later.

## 2020-03-04 NOTE — TELEPHONE ENCOUNTER
Attempted to notify patient, phone # not in service.   Attempted secondary number no answer and no vm

## 2020-05-27 RX ORDER — ALBUTEROL SULFATE 90 UG/1
2 AEROSOL, METERED RESPIRATORY (INHALATION) EVERY 6 HOURS PRN
Qty: 1 INHALER | Refills: 2 | Status: SHIPPED | OUTPATIENT
Start: 2020-05-27

## 2020-05-27 RX ORDER — GABAPENTIN 800 MG/1
800 TABLET ORAL 4 TIMES DAILY
Qty: 120 TABLET | Refills: 5 | Status: SHIPPED | OUTPATIENT
Start: 2020-05-27 | End: 2020-11-18 | Stop reason: SDUPTHER

## 2020-05-27 NOTE — TELEPHONE ENCOUNTER
Roseann Mustafa called requesting a refill on the following medications:  Requested Prescriptions     Pending Prescriptions Disp Refills    gabapentin (NEURONTIN) 800 MG tablet 120 tablet 5     Sig: Take 1 tablet by mouth 4 times daily for 180 days.  albuterol sulfate  (90 Base) MCG/ACT inhaler 1 Inhaler 2     Sig: Inhale 2 puffs into the lungs every 6 hours as needed for Wheezing    mometasone-formoterol (DULERA) 200-5 MCG/ACT inhaler 1 Inhaler 11     Sig: Inhale 2 puffs into the lungs 2 times daily Rinse mouth after using.      Pharmacy verified:  .[facility]      Date of last visit: 12-03-19  Date of next visit (if applicable): 5/00/4368

## 2020-06-30 ENCOUNTER — OFFICE VISIT (OUTPATIENT)
Dept: FAMILY MEDICINE CLINIC | Age: 50
End: 2020-06-30
Payer: MEDICARE

## 2020-06-30 VITALS
SYSTOLIC BLOOD PRESSURE: 114 MMHG | BODY MASS INDEX: 19.57 KG/M2 | DIASTOLIC BLOOD PRESSURE: 68 MMHG | HEART RATE: 64 BPM | RESPIRATION RATE: 18 BRPM | TEMPERATURE: 97.5 F | WEIGHT: 160.8 LBS

## 2020-06-30 PROCEDURE — 99214 OFFICE O/P EST MOD 30 MIN: CPT | Performed by: NURSE PRACTITIONER

## 2020-06-30 PROCEDURE — G8420 CALC BMI NORM PARAMETERS: HCPCS | Performed by: NURSE PRACTITIONER

## 2020-06-30 PROCEDURE — 3017F COLORECTAL CA SCREEN DOC REV: CPT | Performed by: NURSE PRACTITIONER

## 2020-06-30 PROCEDURE — G8427 DOCREV CUR MEDS BY ELIG CLIN: HCPCS | Performed by: NURSE PRACTITIONER

## 2020-06-30 PROCEDURE — 4004F PT TOBACCO SCREEN RCVD TLK: CPT | Performed by: NURSE PRACTITIONER

## 2020-06-30 PROCEDURE — 3023F SPIROM DOC REV: CPT | Performed by: NURSE PRACTITIONER

## 2020-06-30 PROCEDURE — G8926 SPIRO NO PERF OR DOC: HCPCS | Performed by: NURSE PRACTITIONER

## 2020-06-30 RX ORDER — HYDROXYZINE 50 MG/1
50 TABLET, FILM COATED ORAL NIGHTLY PRN
Qty: 30 TABLET | Refills: 1 | Status: SHIPPED | OUTPATIENT
Start: 2020-06-30 | End: 2020-07-10

## 2020-06-30 ASSESSMENT — ENCOUNTER SYMPTOMS
WHEEZING: 0
CHEST TIGHTNESS: 1
RHINORRHEA: 0

## 2020-06-30 NOTE — PROGRESS NOTES
aSubjective:      Patient ID: Shola Sandhu is a 44 y.o. male. HPI  : Follow Up    Chief Complaint   Patient presents with    6 Month Follow-Up     lost his wife 2 months ago, needs a colonoscopy    Pain    Medication Refill       Wife passed away 2 months ago. She had poor health but still surprise. He is coping ok per patient. Good support system. Poor sleep at night. Hard to get to sleep or back to sleep. Hx of Depression and he was on Wellbutrin. Denies desire to go back on. Does feel he is doing well. Vitals:    06/30/20 1301   BP: 114/68   Pulse: 64   Resp: 18   Temp: 97.5 °F (36.4 °C)       Patient Active Problem List   Diagnosis    Left inguinal hernia    Simple chronic bronchitis (HCC)    Chest wall pain following surgery    Empyema lung (HCC)    S/P lobectomy of lung    Histoplasmosis    Depression with anxiety    Intercostal neuralgia     COLONOSCOPY - due    Hx of seeing [facility] PA[facility]. Related to chronic intercostal pain s/p lobectomy in 2016. Was on Norco and Gabapentin.  Had intercostal INJ with no benefits.  Gabapentin was dosed up and he is noticing good benefit of his pain. Denies pain with coughing anymore. He is on Gabapentin 800 mg TID. No longer on [place].      <1/2 ppd smoking. Activity quitting. On Dulera BID and Spiriva. NEB tx PRN. BreaThing is doing pretty well. BP Readings from Last 3 Encounters:   06/30/20 114/68   12/30/19 128/68   06/24/19 112/72     BP wnl.      Due for annual labs    No results found for: LABA1C  No results found for: EAG    No components found for: CHLPL  No results found for: TRIG  No results found for: HDL  No results found for: LDLCALC  No results found for: LABVLDL      Chemistry        Component Value Date/Time     11/16/2017 1504    K 4.5 11/16/2017 1504     11/16/2017 1504    CO2 31 11/16/2017 1504    BUN 14 11/16/2017 1504    CREATININE 1.1 11/16/2017 1504        Component Value Date/Time    CALCIUM 9.6 - CNP

## 2020-11-18 RX ORDER — GABAPENTIN 800 MG/1
800 TABLET ORAL 4 TIMES DAILY
Qty: 120 TABLET | Refills: 5 | Status: SHIPPED | OUTPATIENT
Start: 2020-11-18 | End: 2020-11-19 | Stop reason: SDUPTHER

## 2020-11-19 RX ORDER — GABAPENTIN 800 MG/1
800 TABLET ORAL 4 TIMES DAILY
Qty: 120 TABLET | Refills: 5 | Status: SHIPPED | OUTPATIENT
Start: 2020-11-19 | End: 2021-05-24 | Stop reason: SDUPTHER

## 2020-11-19 NOTE — TELEPHONE ENCOUNTER
Norah Lacey called requesting a refill on the following medications:  Requested Prescriptions     Pending Prescriptions Disp Refills    gabapentin (NEURONTIN) 800 MG tablet 120 tablet 5     Sig: Take 1 tablet by mouth 4 times daily for 180 days.  mometasone-formoterol (DULERA) 200-5 MCG/ACT inhaler 1 Inhaler 11     Sig: Inhale 2 puffs into the lungs 2 times daily Rinse mouth after using. Pharmacy verified: [facility] on [address]  . pv      Date of last visit: 6/30/2020  Date of next visit (if applicable): 91/24/9632

## 2021-01-04 ENCOUNTER — OFFICE VISIT (OUTPATIENT)
Dept: FAMILY MEDICINE CLINIC | Age: 51
End: 2021-01-04
Payer: MEDICARE

## 2021-01-04 VITALS
DIASTOLIC BLOOD PRESSURE: 70 MMHG | RESPIRATION RATE: 20 BRPM | HEIGHT: 76 IN | SYSTOLIC BLOOD PRESSURE: 110 MMHG | BODY MASS INDEX: 18.9 KG/M2 | TEMPERATURE: 96.6 F | HEART RATE: 80 BPM | WEIGHT: 155.2 LBS

## 2021-01-04 DIAGNOSIS — G47.01 INSOMNIA DUE TO MEDICAL CONDITION: Primary | ICD-10-CM

## 2021-01-04 DIAGNOSIS — F17.200 SMOKING: ICD-10-CM

## 2021-01-04 DIAGNOSIS — J86.9 EMPYEMA LUNG (HCC): ICD-10-CM

## 2021-01-04 DIAGNOSIS — F41.8 DEPRESSION WITH ANXIETY: ICD-10-CM

## 2021-01-04 DIAGNOSIS — Z90.2 S/P LOBECTOMY OF LUNG: ICD-10-CM

## 2021-01-04 DIAGNOSIS — J41.0 SIMPLE CHRONIC BRONCHITIS (HCC): ICD-10-CM

## 2021-01-04 DIAGNOSIS — G58.8 INTERCOSTAL NEURALGIA: ICD-10-CM

## 2021-01-04 PROCEDURE — 99214 OFFICE O/P EST MOD 30 MIN: CPT | Performed by: NURSE PRACTITIONER

## 2021-01-04 PROCEDURE — 3017F COLORECTAL CA SCREEN DOC REV: CPT | Performed by: NURSE PRACTITIONER

## 2021-01-04 PROCEDURE — G8427 DOCREV CUR MEDS BY ELIG CLIN: HCPCS | Performed by: NURSE PRACTITIONER

## 2021-01-04 PROCEDURE — G8484 FLU IMMUNIZE NO ADMIN: HCPCS | Performed by: NURSE PRACTITIONER

## 2021-01-04 PROCEDURE — G8420 CALC BMI NORM PARAMETERS: HCPCS | Performed by: NURSE PRACTITIONER

## 2021-01-04 PROCEDURE — 3023F SPIROM DOC REV: CPT | Performed by: NURSE PRACTITIONER

## 2021-01-04 PROCEDURE — G8926 SPIRO NO PERF OR DOC: HCPCS | Performed by: NURSE PRACTITIONER

## 2021-01-04 PROCEDURE — 4004F PT TOBACCO SCREEN RCVD TLK: CPT | Performed by: NURSE PRACTITIONER

## 2021-01-04 RX ORDER — DOXEPIN HYDROCHLORIDE 25 MG/1
25 CAPSULE ORAL NIGHTLY
Qty: 90 CAPSULE | Refills: 0 | Status: SHIPPED | OUTPATIENT
Start: 2021-01-04 | End: 2021-04-19 | Stop reason: SDUPTHER

## 2021-01-04 SDOH — ECONOMIC STABILITY: TRANSPORTATION INSECURITY
IN THE PAST 12 MONTHS, HAS THE LACK OF TRANSPORTATION KEPT YOU FROM MEDICAL APPOINTMENTS OR FROM GETTING MEDICATIONS?: YES

## 2021-01-04 SDOH — ECONOMIC STABILITY: FOOD INSECURITY: WITHIN THE PAST 12 MONTHS, YOU WORRIED THAT YOUR FOOD WOULD RUN OUT BEFORE YOU GOT MONEY TO BUY MORE.: NEVER TRUE

## 2021-01-04 ASSESSMENT — ENCOUNTER SYMPTOMS
CHEST TIGHTNESS: 0
WHEEZING: 0
RHINORRHEA: 0

## 2021-01-04 NOTE — PROGRESS NOTES
Component Value Date    WBC 8.7 11/16/2017    HGB 14.5 11/16/2017    HCT 41.8 (L) 11/16/2017    MCV 84.9 11/16/2017     (H) 11/16/2017       Health Maintenance   Topic Date Due    Hepatitis C screen  1970    Pneumococcal 0-64 years Vaccine (1 of 1 - PPSV23) 07/08/1974    HIV screen  07/08/1983    DTaP/Tdap/Td vaccine (1 - Tdap) 07/08/1987    Lipid screen  07/08/2008    Shingles Vaccine (1 of 2) 07/08/2018    Colon cancer screen colonoscopy  07/08/2018    Flu vaccine (1) 09/01/2020    Hepatitis A vaccine  Aged Out    Hepatitis B vaccine  Aged Out    Hib vaccine  Aged Out    Meningococcal (ACWY) vaccine  Aged Out       There is no immunization history for the selected administration types on file for this patient. Review of Systems   Constitutional: Negative for chills. HENT: Negative for congestion, postnasal drip and rhinorrhea. Respiratory: Negative for chest tightness and wheezing. Musculoskeletal: Positive for arthralgias. Skin: Negative for rash. Neurological: Negative for light-headedness. Psychiatric/Behavioral: Positive for dysphoric mood and sleep disturbance. Objective:   Physical Exam  Constitutional:       General: He is not in acute distress. Appearance: He is well-developed. HENT:      Right Ear: Tympanic membrane normal.      Left Ear: Tympanic membrane normal.      Nose: No mucosal edema or rhinorrhea. Mouth/Throat:      Pharynx: No posterior oropharyngeal erythema. Cardiovascular:      Rate and Rhythm: Normal rate and regular rhythm. Pulses: Normal pulses. Heart sounds: Normal heart sounds, S1 normal and S2 normal. No murmur. No S3 sounds. Pulmonary:      Effort: Pulmonary effort is normal. No tachypnea, accessory muscle usage or respiratory distress. Breath sounds: Decreased breath sounds present. No wheezing or rhonchi. Chest:      Chest wall: Tenderness present. No crepitus.        Abdominal:      General: Bowel sounds are normal.      Palpations: Abdomen is soft. Tenderness: There is no abdominal tenderness. Neurological:      Mental Status: He is alert and oriented to person, place, and time. Psychiatric:         Mood and Affect: Mood is not anxious or depressed. Speech: Speech normal.         Behavior: Behavior is not slowed or withdrawn. Thought Content: Thought content is not paranoid. Judgment: Judgment normal.         Assessment:       Diagnosis Orders   1. Insomnia due to medical condition  doxepin (SINEQUAN) 25 MG capsule   2. Simple chronic bronchitis (HCC)     3. Smoking     4. Empyema lung ([facility] [address]     5. S/P lobectomy of lung     6. Intercostal neuralgia     7. Depression with anxiety             Plan:      Chronic conditions stable  Labs encouraged to complete  CRS options discussed - cologuard encouraged to complete  Immunizations discussed - denied  Smoking cessation  Add Doxepin 25 mg HS for sleep  RTO in 6 months    Denise Cooper received counseling on the following healthy behaviors: tobacco cessation    Patient given educational materials on Smoking Cessation    I have instructed Denise Cooper to complete a self tracking handout on Smoking and instructed them to bring it with them to his next appointment. Discussed use, benefit, and side effects of prescribed medications. Barriers to medication compliance addressed. All patient questions answered. Pt voiced understanding.            LILIA Canas - CNP

## 2021-01-04 NOTE — PROGRESS NOTES
Chronic Disease Visit Information    BP Readings from Last 3 Encounters:   06/30/20 114/68   12/30/19 128/68   06/24/19 112/72          BUN (mg/dL)   Date Value   11/16/2017 14     CREATININE (mg/dL)   Date Value   11/16/2017 1.1     Glucose (mg/dL)   Date Value   11/16/2017 70            Have you changed or started any medications since your last visit including any over-the-counter medicines, vitamins, or herbal medicines? no   Are you having any side effects from any of your medications? -  no  Have you stopped taking any of your medications? Is so, why? -  no    Have you seen any other physician or provider since your last visit? No  Have you had any other diagnostic tests since your last visit? No   Have you been seen in the emergency room and/or had an admission to a hospital since we last saw you? No  Have you had your annual diabetic retinal (eye) exam? No  Have you had your routine dental cleaning in the past 6 months? no    Have you activated your [employer] account? If not, what are your barriers?  Yes     Patient Care Team:  LILIA Hernandez - CNP as PCP - General (Certified Nurse Practitioner)  LILIA Hernandez - CNP as PCP - [facility]         Medical History Review  Past Medical, Family, and Social History reviewed and does contribute to the patient presenting condition    Health Maintenance   Topic Date Due    Hepatitis C screen  1970    Pneumococcal 0-64 years Vaccine (1 of 1 - PPSV23) 07/08/1974    HIV screen  07/08/1983    DTaP/Tdap/Td vaccine (1 - Tdap) 07/08/1987    Lipid screen  07/08/2008    Shingles Vaccine (1 of 2) 07/08/2018    Colon cancer screen colonoscopy  07/08/2018    Flu vaccine (1) 09/01/2020    Hepatitis A vaccine  Aged Out    Hepatitis B vaccine  Aged Out    Hib vaccine  Aged Out    Meningococcal (ACWY) vaccine  Aged Out

## 2021-04-19 DIAGNOSIS — G47.01 INSOMNIA DUE TO MEDICAL CONDITION: ICD-10-CM

## 2021-04-19 RX ORDER — DOXEPIN HYDROCHLORIDE 25 MG/1
25 CAPSULE ORAL NIGHTLY
Qty: 90 CAPSULE | Refills: 0 | Status: SHIPPED | OUTPATIENT
Start: 2021-04-19 | End: 2021-07-16

## 2021-04-19 NOTE — TELEPHONE ENCOUNTER
Requested Prescriptions     Pending Prescriptions Disp Refills    doxepin (SINEQUAN) 25 MG capsule 90 capsule 0     Sig: Take 1 capsule by mouth nightly

## 2021-05-24 DIAGNOSIS — G58.8 INTERCOSTAL NEURALGIA: ICD-10-CM

## 2021-05-24 RX ORDER — GABAPENTIN 800 MG/1
800 TABLET ORAL 4 TIMES DAILY
Qty: 120 TABLET | Refills: 5 | Status: SHIPPED | OUTPATIENT
Start: 2021-05-24 | End: 2022-03-01

## 2021-05-24 NOTE — TELEPHONE ENCOUNTER
Patient requesting refill of Neurontin [facility] [place] [address] [facility]    Please refill if appropriate

## 2022-02-08 ENCOUNTER — TELEPHONE (OUTPATIENT)
Dept: FAMILY MEDICINE CLINIC | Age: 52
End: 2022-02-08

## 2022-02-08 DIAGNOSIS — J41.0 SIMPLE CHRONIC BRONCHITIS (HCC): ICD-10-CM

## 2022-02-08 DIAGNOSIS — Z63.4 DEATH OF FAMILY MEMBER: Primary | ICD-10-CM

## 2022-02-08 RX ORDER — MOMETASONE FUROATE AND FORMOTEROL FUMARATE DIHYDRATE 200; 5 UG/1; UG/1
AEROSOL RESPIRATORY (INHALATION)
Qty: 13 G | Refills: 11 | Status: SHIPPED | OUTPATIENT
Start: 2022-02-08

## 2022-02-08 RX ORDER — HYDROXYZINE HYDROCHLORIDE 25 MG/1
25 TABLET, FILM COATED ORAL EVERY 8 HOURS PRN
Qty: 30 TABLET | Refills: 1 | Status: SHIPPED | OUTPATIENT
Start: 2022-02-08 | End: 2022-02-18

## 2022-02-08 SDOH — SOCIAL STABILITY - SOCIAL INSECURITY: DISSAPEARANCE AND DEATH OF FAMILY MEMBER: Z63.4

## 2022-02-08 NOTE — TELEPHONE ENCOUNTER
Pt called office stating his mom just passed away yesterday \"I'm not doing good\". Pt is tearful on the phone saying \"I feel like I'm having a nervous breakdown\". Pt is requesting medication or something to get him through this time. Please advise.

## 2022-03-01 DIAGNOSIS — G58.8 INTERCOSTAL NEURALGIA: ICD-10-CM

## 2022-03-01 RX ORDER — GABAPENTIN 800 MG/1
TABLET ORAL
Qty: 120 TABLET | Refills: 5 | Status: SHIPPED | OUTPATIENT
Start: 2022-03-01 | End: 2022-08-28

## 2022-07-07 ENCOUNTER — OFFICE VISIT (OUTPATIENT)
Dept: FAMILY MEDICINE CLINIC | Age: 52
End: 2022-07-07
Payer: MEDICARE

## 2022-07-07 VITALS
BODY MASS INDEX: 18.46 KG/M2 | HEIGHT: 76 IN | DIASTOLIC BLOOD PRESSURE: 78 MMHG | SYSTOLIC BLOOD PRESSURE: 144 MMHG | WEIGHT: 151.6 LBS | HEART RATE: 76 BPM | RESPIRATION RATE: 20 BRPM

## 2022-07-07 DIAGNOSIS — F33.1 MODERATE EPISODE OF RECURRENT MAJOR DEPRESSIVE DISORDER (HCC): ICD-10-CM

## 2022-07-07 DIAGNOSIS — J41.0 SIMPLE CHRONIC BRONCHITIS (HCC): ICD-10-CM

## 2022-07-07 DIAGNOSIS — G47.01 INSOMNIA DUE TO MEDICAL CONDITION: ICD-10-CM

## 2022-07-07 DIAGNOSIS — F43.21 DYSFUNCTIONAL GRIEVING: Primary | ICD-10-CM

## 2022-07-07 PROCEDURE — G8427 DOCREV CUR MEDS BY ELIG CLIN: HCPCS | Performed by: NURSE PRACTITIONER

## 2022-07-07 PROCEDURE — G8419 CALC BMI OUT NRM PARAM NOF/U: HCPCS | Performed by: NURSE PRACTITIONER

## 2022-07-07 PROCEDURE — 3023F SPIROM DOC REV: CPT | Performed by: NURSE PRACTITIONER

## 2022-07-07 PROCEDURE — 99213 OFFICE O/P EST LOW 20 MIN: CPT | Performed by: NURSE PRACTITIONER

## 2022-07-07 PROCEDURE — 4004F PT TOBACCO SCREEN RCVD TLK: CPT | Performed by: NURSE PRACTITIONER

## 2022-07-07 PROCEDURE — 3017F COLORECTAL CA SCREEN DOC REV: CPT | Performed by: NURSE PRACTITIONER

## 2022-07-07 RX ORDER — BUPROPION HYDROCHLORIDE 150 MG/1
150 TABLET ORAL EVERY MORNING
Qty: 30 TABLET | Refills: 1 | Status: SHIPPED | OUTPATIENT
Start: 2022-07-07 | End: 2022-08-24 | Stop reason: SDUPTHER

## 2022-07-07 RX ORDER — DOXEPIN HYDROCHLORIDE 25 MG/1
25 CAPSULE ORAL NIGHTLY
Qty: 90 CAPSULE | Refills: 3 | Status: SHIPPED | OUTPATIENT
Start: 2022-07-07

## 2022-07-07 SDOH — ECONOMIC STABILITY: FOOD INSECURITY: WITHIN THE PAST 12 MONTHS, YOU WORRIED THAT YOUR FOOD WOULD RUN OUT BEFORE YOU GOT MONEY TO BUY MORE.: SOMETIMES TRUE

## 2022-07-07 SDOH — ECONOMIC STABILITY: FOOD INSECURITY: WITHIN THE PAST 12 MONTHS, THE FOOD YOU BOUGHT JUST DIDN'T LAST AND YOU DIDN'T HAVE MONEY TO GET MORE.: NEVER TRUE

## 2022-07-07 ASSESSMENT — SOCIAL DETERMINANTS OF HEALTH (SDOH): HOW HARD IS IT FOR YOU TO PAY FOR THE VERY BASICS LIKE FOOD, HOUSING, MEDICAL CARE, AND HEATING?: NOT HARD AT ALL

## 2022-07-07 ASSESSMENT — PATIENT HEALTH QUESTIONNAIRE - PHQ9
SUM OF ALL RESPONSES TO PHQ QUESTIONS 1-9: 20
4. FEELING TIRED OR HAVING LITTLE ENERGY: 3
10. IF YOU CHECKED OFF ANY PROBLEMS, HOW DIFFICULT HAVE THESE PROBLEMS MADE IT FOR YOU TO DO YOUR WORK, TAKE CARE OF THINGS AT HOME, OR GET ALONG WITH OTHER PEOPLE: 3
9. THOUGHTS THAT YOU WOULD BE BETTER OFF DEAD, OR OF HURTING YOURSELF: 1
2. FEELING DOWN, DEPRESSED OR HOPELESS: 3
SUM OF ALL RESPONSES TO PHQ QUESTIONS 1-9: 20
5. POOR APPETITE OR OVEREATING: 0
8. MOVING OR SPEAKING SO SLOWLY THAT OTHER PEOPLE COULD HAVE NOTICED. OR THE OPPOSITE, BEING SO FIGETY OR RESTLESS THAT YOU HAVE BEEN MOVING AROUND A LOT MORE THAN USUAL: 3
SUM OF ALL RESPONSES TO PHQ QUESTIONS 1-9: 20
1. LITTLE INTEREST OR PLEASURE IN DOING THINGS: 3
6. FEELING BAD ABOUT YOURSELF - OR THAT YOU ARE A FAILURE OR HAVE LET YOURSELF OR YOUR FAMILY DOWN: 3
SUM OF ALL RESPONSES TO PHQ9 QUESTIONS 1 & 2: 6
7. TROUBLE CONCENTRATING ON THINGS, SUCH AS READING THE NEWSPAPER OR WATCHING TELEVISION: 1
3. TROUBLE FALLING OR STAYING ASLEEP: 3
SUM OF ALL RESPONSES TO PHQ QUESTIONS 1-9: 19

## 2022-07-07 ASSESSMENT — COLUMBIA-SUICIDE SEVERITY RATING SCALE - C-SSRS
2. HAVE YOU ACTUALLY HAD ANY THOUGHTS OF KILLING YOURSELF?: NO
1. WITHIN THE PAST MONTH, HAVE YOU WISHED YOU WERE DEAD OR WISHED YOU COULD GO TO SLEEP AND NOT WAKE UP?: YES
6. HAVE YOU EVER DONE ANYTHING, STARTED TO DO ANYTHING, OR PREPARED TO DO ANYTHING TO END YOUR LIFE?: NO

## 2022-07-07 ASSESSMENT — ENCOUNTER SYMPTOMS
SHORTNESS OF BREATH: 0
SORE THROAT: 0
COUGH: 0

## 2022-07-07 NOTE — LETTER
[address] [id number]  Phone: [phone]  Fax: [phone] [facility], LILIA - CNP        July 7, 2022     Patient: Oneyda Canseco   YOB: 1970   Date of Visit: 7/7/2022       To Whom It May Concern: It is my medical opinion that Bashir Jin should remain out of work until 7/14/22. If you have any questions or concerns, please don't hesitate to call.     Sincerely,        LILIA Capone CNP

## 2022-07-07 NOTE — PROGRESS NOTES
Julian Whitmore (1970) 46 y.o. male here for evaluation of the following chief complaint(s):      HPI:  Chief Complaint   Patient presents with    Stress     pt is tearful in the office today    Depression     pt has a history of suicidal thoughts and currently has them off and on       Mom passed away in February 2022. Things are worsening since then. More depressed. Work is very stressful but enjoys work. Lack of motivation. Emotional.  Fatigue. Suicidal thoughts off and on in past.  Denies today. No plan. Wife and brother had passed away within the last 2 years    Sister is supportive system - talks to everyday. Patient Active Problem List   Diagnosis    Left inguinal hernia    Simple chronic bronchitis (HCC)    Chest wall pain following surgery    Empyema lung ([facility] [address]    S/P lobectomy of lung    Histoplasmosis    Depression with anxiety    Intercostal neuralgia       SUBJECTIVE/OBJECTIVE:  Review of Systems   Constitutional: Positive for fatigue. Negative for chills and fever. HENT: Negative for congestion and sore throat. Respiratory: Negative for cough and shortness of breath. Cardiovascular: Negative for chest pain. Skin: Negative. Neurological: Negative. Negative for dizziness and headaches. Psychiatric/Behavioral: Positive for dysphoric mood. Negative for self-injury, sleep disturbance and suicidal ideas. Physical Exam  Constitutional:       General: He is not in acute distress. HENT:      Head: Normocephalic. Right Ear: Tympanic membrane normal.      Left Ear: Tympanic membrane normal.      Nose: Nose normal.   Eyes:      Pupils: Pupils are equal, round, and reactive to light. Neck:      Vascular: No carotid bruit. Cardiovascular:      Rate and Rhythm: Normal rate and regular rhythm. Pulses: Normal pulses. Heart sounds: Normal heart sounds. No murmur heard.       Pulmonary:      Effort: Pulmonary effort is normal.      Breath sounds: Normal breath sounds. No wheezing. Abdominal:      General: Bowel sounds are normal. There is no distension. Palpations: Abdomen is soft. Tenderness: There is no abdominal tenderness. Musculoskeletal:         General: No tenderness. Normal range of motion. Cervical back: Normal range of motion and neck supple. Skin:     General: Skin is warm and dry. Findings: No rash. Neurological:      Mental Status: He is alert and oriented to person, place, and time. Psychiatric:         Attention and Perception: Attention normal.         Mood and Affect: Mood is depressed. Behavior: Behavior is slowed and withdrawn. Thought Content: Thought content does not include suicidal ideation. Thought content does not include suicidal plan. Cognition and Memory: Cognition normal.         Judgment: Judgment normal.           ASSESSMENT/PLAN:   Diagnosis Orders   1. Dysfunctional grieving  sertraline (ZOLOFT) 50 MG tablet    buPROPion (WELLBUTRIN XL) 150 MG extended release tablet    Jobzella   2. Insomnia due to medical condition  doxepin (SINEQUAN) 25 MG capsule   3. Simple chronic bronchitis ([address]     4. Moderate episode of recurrent major depressive disorder (HCC)  sertraline (ZOLOFT) 50 MG tablet    buPROPion (WELLBUTRIN XL) 150 MG extended release tablet    Jobzella         MDM: Refer to Grief Counseling   Zoloft + Wellbutrin    - benefit with mood in past   Continue Doxepin for sleep   Good support from Sister   RTO in 1 month      An electronic signature was used to authenticate this note.     --Indiana Crimes, APRN - CNP

## 2022-07-08 ENCOUNTER — CLINICAL DOCUMENTATION (OUTPATIENT)
Dept: SPIRITUAL SERVICES | Facility: CLINIC | Age: 52
End: 2022-07-08

## 2022-07-08 ENCOUNTER — CARE COORDINATION (OUTPATIENT)
Dept: CARE COORDINATION | Age: 52
End: 2022-07-08

## 2022-07-08 NOTE — FLOWSHEET NOTE
[address] [id number] PROGRESS NOTE      Patient: Connor Dudley        YOB: 1970  Age: 45 y.o. Gender: male            Assessment:  Aime Lynn is a 41-year-old male who is being referred for grief support and life-adjustment due to what is currently being experienced in his life. *  Per referral, grief support due to recent deaths and multiple losses in his life. Interventions:   made an initial attempt to contact Aime Lynn via telephone call to offer support, if desired, due to what is currently being experienced in his life. Outcomes:  No answer. No voicemail mailbox set up. No message could be left. Plan:  1.  will follow-up.     Electronically signed by Isa Root on 7/8/2022 at 3:19 PM.  Fredi Contreras  [phone]

## 2022-07-08 NOTE — CARE COORDINATION
Called pt introduced self and my role. Informed him that I will be mailing out a food pantry list. Pt voiced understanding. Will follow up in a couple weeks to make sure he receives.

## 2022-07-11 ENCOUNTER — CARE COORDINATION (OUTPATIENT)
Dept: CARE COORDINATION | Age: 52
End: 2022-07-11

## 2022-07-11 NOTE — CARE COORDINATION
Attempted to reach Patti Bajwa today for care coordination enrollment. No answer. Unable to leave message.

## 2022-07-12 ENCOUNTER — CARE COORDINATION (OUTPATIENT)
Dept: CARE COORDINATION | Age: 52
End: 2022-07-12

## 2022-07-13 ENCOUNTER — CLINICAL DOCUMENTATION (OUTPATIENT)
Dept: SPIRITUAL SERVICES | Facility: CLINIC | Age: 52
End: 2022-07-13

## 2022-07-18 ENCOUNTER — CARE COORDINATION (OUTPATIENT)
Dept: CARE COORDINATION | Age: 52
End: 2022-07-18

## 2022-07-18 SDOH — ECONOMIC STABILITY: INCOME INSECURITY: IN THE LAST 12 MONTHS, WAS THERE A TIME WHEN YOU WERE NOT ABLE TO PAY THE MORTGAGE OR RENT ON TIME?: NO

## 2022-07-18 SDOH — ECONOMIC STABILITY: HOUSING INSECURITY: IN THE LAST 12 MONTHS, HOW MANY PLACES HAVE YOU LIVED?: 1

## 2022-07-18 SDOH — ECONOMIC STABILITY: HOUSING INSECURITY
IN THE LAST 12 MONTHS, WAS THERE A TIME WHEN YOU DID NOT HAVE A STEADY PLACE TO SLEEP OR SLEPT IN A SHELTER (INCLUDING NOW)?: NO

## 2022-07-18 SDOH — ECONOMIC STABILITY: TRANSPORTATION INSECURITY
IN THE PAST 12 MONTHS, HAS LACK OF TRANSPORTATION KEPT YOU FROM MEETINGS, WORK, OR FROM GETTING THINGS NEEDED FOR DAILY LIVING?: NO

## 2022-07-18 ASSESSMENT — ENCOUNTER SYMPTOMS: DYSPNEA ASSOCIATED WITH: EXERTION

## 2022-07-18 NOTE — CARE COORDINATION
Ambulatory Care Coordination Note  7/18/2022    ACC: Carla Ramos, RN    Summary Note: Cherylene Due was referred to care coordination by PCP for assistance and education in managing his chronic conditions and support in connecting with resources. Pt has h/o histoplasmosis, depression, left lobectomy  Depression: recently started on Wellbutrin, Zoloft. Spiritual care referral placed. Pt reports best available time is after 5 pm or 1230-130pm.  Discussed behavioral health referral. Pt not interested at this time. Has followed with a couple of behavioral health services in past, including [facility]. Chronic bronchitis-active smoker. No desire to quit. Educated on smoking cessation program.  On Dulera and Spiriva. Has nebulizer and uses albuterol nebs PRN. Also has albuterol inhaler. Has f/u with pulmonary in past but currently not seeing anyone. Spoke with pt about getting re-established with pulm doctor. Will discuss with upcoming calls. Has OREILLY. Has chronic cough producing yellow sputum. Plan of care:   Weekly f/u calls   to mail out COPD zone tools  Consider pulmonary referral. Pt used to follow with one in past and hasn't for quite sometime. Staff message sent to spiritual care: Jean Gonzalez to inform him of best time to reach pt. Work to stay active  Deep breathing exercises  Ongoing discussions re: behavioral health-pt declines referral at this time.    Jose Dewitt following for food resources  COPD Assessment    Does the patient understand envrionmental exposure?: No  Is the patient able to verbalize Rescue vs. Long Acting medications?: Yes  Does the patient have a nebulizer?: Yes            Symptoms:  None: Yes      Symptom course: stable  Breathlessness: exertion  Increase use of rapid acting/rescue inhaled medications?: No  Change in chronic cough?: No/At Baseline  Change in sputum?: No/At Baseline  Sputum characteristics: Yellow  Self Monitoring - SaO2: No  Have you had a recent diagnosis of pneumonia either by PCP or at a hospital?: No      and   General Assessment    Do you have any symptoms that are causing concern?: Yes  Progression since Onset: Unchanged  Reported Symptoms:  (Comment: depression. recently started on zoloft and wellbutrin. taking without difficulty. spiritual care referral was placed. has not been able to reach . message routed to spiritual care)           Ambulatory Care Coordination Assessment    Care Coordination Protocol  Referral from Primary Care Provider: No  Week 1 - Initial Assessment     Do you have all of your prescriptions and are they filled?: Yes  Barriers to medication adherence: None  Are you able to afford your medications?: Yes  How often do you have trouble taking your medications the way you have been told to take them?: I always take them as prescribed. Do you have Home O2 Therapy?: No      Ability to seek help/take action for Emergent Urgent situations i.e. fire, crime, inclement weather or health crisis. : Independent  Ability to ambulate to restroom: Independent  Ability handle personal hygeine needs (bathing/dressing/grooming): Independent  Ability to manage Medications: Independent  Ability to prepare Food Preparation: Independent  Ability to maintain home (clean home, laundry): Independent  Ability to drive and/or has transportation: Independent  Ability to do shopping: Independent  Ability to manage finances:  Independent  Is patient able to live independently?: Yes     Current Housing: Private Residence        Per the Fall Risk Screening, did the patient have 2 or more falls or 1 fall with injury in the past year?: No     Frequent urination at night?: No  Do you use rails/bars?: No     Are you experiencing loss of meaning?: Yes (Comment: referral has been placed to spiritual care by PCP)     Thinking about your patient's physical health needs, are there any symptoms or problems (risk indicators) you are unsure about that require further

## 2022-07-26 ENCOUNTER — CARE COORDINATION (OUTPATIENT)
Dept: CARE COORDINATION | Age: 52
End: 2022-07-26

## 2022-07-26 ASSESSMENT — ENCOUNTER SYMPTOMS: DYSPNEA ASSOCIATED WITH: MINIMAL EXERTION

## 2022-07-26 NOTE — CARE COORDINATION
Ambulatory Care Coordination Note  7/26/2022    ACC: Florencia Yao, RN    Summary Note: Alfonso Castro is being followed by care coordination for education and assistance in managing his chronic conditions and assisting with healthcare needs. Pt has h/o histoplasmosis, depression, left lobectomy. Spoke with Alfonso Castro briefly today. Currently at work. States that over last week he has noticed becoming more SOB. Not getting relief as he usually does from his inhaler. Coughing up green colored sputum. Doing breathing tx's when at home. Pt continues to be under significant amt of stress. Has another job offer but not sure if he should take it or not. Offered support and encouragement. COPD Assessment    Does the patient understand envrionmental exposure?: No  Is the patient able to verbalize Rescue vs. Long Acting medications?: Yes  Does the patient have a nebulizer?: Yes            Symptoms:     Symptom course: worsening  Breathlessness: minimal exertion  Increase use of rapid acting/rescue inhaled medications?: Yes  Change in chronic cough?: Increased  Change in sputum?: Increased  Sputum characteristics: Green  Self Monitoring - SaO2: No  Have you had a recent diagnosis of pneumonia either by PCP or at a hospital?: No           Lab Results       None            Care Coordination Interventions    Referral from Primary Care Provider: No  Suggested Interventions and Community Resources          Goals Addressed    None         Prior to Admission medications    Medication Sig Start Date End Date Taking?  Authorizing Provider   doxepin (SINEQUAN) 25 MG capsule Take 1 capsule by mouth nightly 7/7/22   LILIA Capone CNP   sertraline (ZOLOFT) 50 MG tablet Take 1 tablet by mouth daily 7/7/22   LILIA Capone CNP   buPROPion (WELLBUTRIN XL) 150 MG extended release tablet Take 1 tablet by mouth every morning 7/7/22   LILIA Capone CNP   gabapentin (NEURONTIN) 800 MG tablet take 1 tablet by mouth four times a day 3/1/22 8/28/22  LILIA Menjivar CNP   DULERA 200-5 MCG/ACT inhaler inhale 2 puffs by mouth and INTO THE LUNGS twice a day 2/8/22   LILIA Menjivar CNP   tiotropium (SPIRIVA RESPIMAT) 2.5 MCG/ACT AERS inhaler Inhale 2 puffs into the lungs daily 6/30/20   LILIA Menjivar CNP   albuterol sulfate  (90 Base) MCG/ACT inhaler Inhale 2 puffs into the lungs every 6 hours as needed for Wheezing 5/27/20   LILIA Menjivar CNP   ipratropium-albuterol (DUONEB) 0.5-2.5 (3) MG/3ML SOLN nebulizer solution Inhale 1 vial into the lungs every 4 hours    Historical Provider, MD   acetaminophen (TYLENOL) 325 MG tablet Take 2 tablets by mouth every 4 hours as needed for Pain or Fever 2/28/17   Marina Romero MD   Respiratory Therapy Supplies (NEBULIZER COMPRESSOR) KIT 1 kit by Does not apply route once for 1 dose 2/24/17 11/20/17  LILIA Menjivar CNP       Future Appointments   Date Time Provider Elsa Aleman   8/8/2022  1:15 PM LILIA Menjivar

## 2022-07-26 NOTE — CARE COORDINATION
States that over last week he has noticed becoming more SOB. Not getting relief as he usually does from his inhaler. Coughing up green colored sputum. Doing breathing tx's when at home. Please advise. If medication prescribed please send to [facility] on [facility].

## 2022-07-27 ENCOUNTER — CARE COORDINATION (OUTPATIENT)
Dept: CARE COORDINATION | Age: 52
End: 2022-07-27

## 2022-07-27 NOTE — CARE COORDINATION
Called pt to follow up with food insecurities and to make sure he received the food pantry list I sent him in the mail. Pt unavailable at the time of my call and vm is not set up. Will try later.      Plan of care  Provide resources  Assist with food insecurities

## 2022-07-31 ENCOUNTER — CARE COORDINATION (OUTPATIENT)
Dept: CARE COORDINATION | Age: 52
End: 2022-07-31

## 2022-08-03 ENCOUNTER — CARE COORDINATION (OUTPATIENT)
Dept: CARE COORDINATION | Age: 52
End: 2022-08-03

## 2022-08-09 ENCOUNTER — CLINICAL DOCUMENTATION (OUTPATIENT)
Dept: SPIRITUAL SERVICES | Facility: CLINIC | Age: 52
End: 2022-08-09

## 2022-08-09 NOTE — PROGRESS NOTES
Attempted to call Cherylene Due as he is being referred by Dr. Sukhdeep Smith for grieving  by our staff in the out-patient spiritual and emotional services. No one answered the phone and it is not yet set up to take messages. This  will attempt to call again.

## 2022-08-12 ENCOUNTER — CARE COORDINATION (OUTPATIENT)
Dept: CARE COORDINATION | Age: 52
End: 2022-08-12

## 2022-08-12 ASSESSMENT — ENCOUNTER SYMPTOMS: DYSPNEA ASSOCIATED WITH: EXERTION

## 2022-08-12 NOTE — CARE COORDINATION
Ambulatory Care Coordination Note  8/12/2022    ACC: Bayron West, RN    Summary Note:   Krish Weiner is being followed by care coordination for education and assistance in managing his chronic conditions and assisting with healthcare needs. Pt has h/o histoplasmosis, depression, left lobectomy. Spoke with Krish Weiner briefly today. Pt reports that he is doing better with his emotional and mental state since starting on Zoloft and Wellbutrin. Spiritual care has been trying to reach pt but has not been successful. COPD: pt reports compliance with inhaled medications. Has chronic cough. Smokes. Pt did not seek tx last mth after being advised to do so when having increased cough. Denies worsening s/s today. Pt not current with pulmonary doctor. Was dismissed from previous practice. Encouraged pt to get established with new pulmonary doctor given hx. Discussed other providers in area. Pt not able to talk long as he is at work. Pt states that he will call back. COPD Assessment    Does the patient understand envrionmental exposure?: No  Is the patient able to verbalize Rescue vs. Long Acting medications?: Yes  Does the patient have a nebulizer?: Yes            Symptoms:  None: Yes      Symptom course: stable  Breathlessness: exertion  Increase use of rapid acting/rescue inhaled medications?: No  Change in chronic cough?: No/At Baseline  Change in sputum?: No/At Baseline  Self Monitoring - SaO2: No  Have you had a recent diagnosis of pneumonia either by PCP or at a hospital?: No         Lab Results       None            Care Coordination Interventions    Referral from Primary Care Provider: No  Suggested Interventions and Community Resources  Zone Management Tools: In Process          Goals Addressed                   This Visit's Progress     Conditions and Symptoms   Worsening     I will schedule office visits, as directed by my provider. I will keep my appointment or reschedule if I have to cancel.   I will notify my provider of any barriers to my plan of care. I will follow my Zone Management tool to seek urgent or emergent care. I will notify my provider of any symptoms that indicate a worsening of my condition. COPD    Barriers: lack of support, overwhelmed by complexity of regimen, stress, and lack of education  Plan for overcoming my barriers: support and education from [facility], PCP, COPD zone tools  Confidence: 8/10  Anticipated Goal Completion Date: 10/18/22                Prior to Admission medications    Medication Sig Start Date End Date Taking? Authorizing Provider   doxepin (SINEQUAN) 25 MG capsule Take 1 capsule by mouth nightly 7/7/22   LILIA Rodriguez CNP   sertraline (ZOLOFT) 50 MG tablet Take 1 tablet by mouth daily 7/7/22   LILIA Rodriguez CNP   buPROPion (WELLBUTRIN XL) 150 MG extended release tablet Take 1 tablet by mouth every morning 7/7/22   LILIA Rodriguez CNP   gabapentin (NEURONTIN) 800 MG tablet take 1 tablet by mouth four times a day 3/1/22 8/28/22  LILIA Rodriguez CNP   Agapito Body 200-5 MCG/ACT inhaler inhale 2 puffs by mouth and INTO THE LUNGS twice a day 2/8/22   LILIA Rodriguez CNP   tiotropium (SPIRIVA RESPIMAT) 2.5 MCG/ACT AERS inhaler Inhale 2 puffs into the lungs daily 6/30/20   LILIA Rodriguez CNP   albuterol sulfate  (90 Base) MCG/ACT inhaler Inhale 2 puffs into the lungs every 6 hours as needed for Wheezing 5/27/20   LILIA Rodriguez CNP   ipratropium-albuterol (DUONEB) 0.5-2.5 (3) MG/3ML SOLN nebulizer solution Inhale 1 vial into the lungs every 4 hours    Historical Provider, MD   acetaminophen (TYLENOL) 325 MG tablet Take 2 tablets by mouth every 4 hours as needed for Pain or Fever 2/28/17   Ivan Stauffer MD   Respiratory Therapy Supplies (NEBULIZER COMPRESSOR) KIT 1 kit by Does not apply route once for 1 dose 2/24/17 11/20/17  LILIA Rodriguez CNP       No future appointments.

## 2022-08-23 ENCOUNTER — CARE COORDINATION (OUTPATIENT)
Dept: CARE COORDINATION | Age: 52
End: 2022-08-23

## 2022-08-23 ASSESSMENT — ENCOUNTER SYMPTOMS: DYSPNEA ASSOCIATED WITH: MINIMAL EXERTION

## 2022-08-23 NOTE — CARE COORDINATION
Ambulatory Care Coordination Note  8/23/2022    ACC: Agatha Clements, RN    Summary Note: Asuncion Perez is being followed by care coordination for education and assistance in managing his chronic conditions and assisting with healthcare needs. Pt has h/o histoplasmosis, depression, left lobectomy. Spoke with Asuncion Perez briefly today. Pt currently at work. States that he is trying to take it easy d/t experiencing increased SOB recently. Has been having to do nebulizer tx's more often. Reports at times he is doing them every 3-4 hrs. Advised pt to try to not do them more frequently than that. Reports that he is using his Dulera and Spiriva as prescribed. Pt missed his 8/8 appt. Rescheduled appt for pt to be seen tomorrow at 745am given reported s/s. Plan of care:  Appt arranged for pt to be seen tomorrow d/t increased SOB and increased use of nebulizer tx's. Reinforce COPD zones  Get established with new PULM. COPD Assessment    Does the patient understand envrionmental exposure?: No  Is the patient able to verbalize Rescue vs. Long Acting medications?: Yes  Does the patient have a nebulizer?: Yes            Symptoms:  COPD associated increased fatigue: Pos      Symptom course: worsening  Breathlessness: minimal exertion  Increase use of rapid acting/rescue inhaled medications?: Yes  Change in chronic cough?: Increased  Self Monitoring - SaO2: No  Have you had a recent diagnosis of pneumonia either by PCP or at a hospital?: No           Lab Results       None            Care Coordination Interventions    Referral from Primary Care Provider: No  Suggested Interventions and Community Resources  Zone Management Tools: In Process          Goals Addressed                      This Visit's Progress      Conditions and Symptoms   On track      I will schedule office visits, as directed by my provider. I will keep my appointment or reschedule if I have to cancel.   I will notify my provider of any barriers to my plan

## 2022-08-24 ENCOUNTER — OFFICE VISIT (OUTPATIENT)
Dept: FAMILY MEDICINE CLINIC | Age: 52
End: 2022-08-24
Payer: MEDICARE

## 2022-08-24 VITALS
WEIGHT: 153.1 LBS | OXYGEN SATURATION: 99 % | SYSTOLIC BLOOD PRESSURE: 140 MMHG | HEART RATE: 74 BPM | RESPIRATION RATE: 18 BRPM | DIASTOLIC BLOOD PRESSURE: 92 MMHG | BODY MASS INDEX: 18.64 KG/M2

## 2022-08-24 DIAGNOSIS — Z90.2 S/P LOBECTOMY OF LUNG: ICD-10-CM

## 2022-08-24 DIAGNOSIS — F17.200 SMOKING: ICD-10-CM

## 2022-08-24 DIAGNOSIS — J44.1 COPD EXACERBATION (HCC): Primary | ICD-10-CM

## 2022-08-24 DIAGNOSIS — F33.1 MODERATE EPISODE OF RECURRENT MAJOR DEPRESSIVE DISORDER (HCC): ICD-10-CM

## 2022-08-24 DIAGNOSIS — R05.9 COUGH: ICD-10-CM

## 2022-08-24 DIAGNOSIS — G58.8 INTERCOSTAL NEURALGIA: ICD-10-CM

## 2022-08-24 DIAGNOSIS — F43.21 DYSFUNCTIONAL GRIEVING: ICD-10-CM

## 2022-08-24 DIAGNOSIS — J86.9 EMPYEMA LUNG (HCC): ICD-10-CM

## 2022-08-24 PROCEDURE — 99214 OFFICE O/P EST MOD 30 MIN: CPT | Performed by: NURSE PRACTITIONER

## 2022-08-24 PROCEDURE — 4004F PT TOBACCO SCREEN RCVD TLK: CPT | Performed by: NURSE PRACTITIONER

## 2022-08-24 PROCEDURE — G8427 DOCREV CUR MEDS BY ELIG CLIN: HCPCS | Performed by: NURSE PRACTITIONER

## 2022-08-24 PROCEDURE — G8420 CALC BMI NORM PARAMETERS: HCPCS | Performed by: NURSE PRACTITIONER

## 2022-08-24 PROCEDURE — 3023F SPIROM DOC REV: CPT | Performed by: NURSE PRACTITIONER

## 2022-08-24 PROCEDURE — 3017F COLORECTAL CA SCREEN DOC REV: CPT | Performed by: NURSE PRACTITIONER

## 2022-08-24 RX ORDER — BUPROPION HYDROCHLORIDE 150 MG/1
150 TABLET ORAL EVERY MORNING
Qty: 90 TABLET | Refills: 3 | Status: SHIPPED | OUTPATIENT
Start: 2022-08-24

## 2022-08-24 RX ORDER — PREDNISONE 50 MG/1
50 TABLET ORAL DAILY
Qty: 5 TABLET | Refills: 0 | Status: SHIPPED | OUTPATIENT
Start: 2022-08-24 | End: 2022-10-11 | Stop reason: SDUPTHER

## 2022-08-24 RX ORDER — SERTRALINE HYDROCHLORIDE 100 MG/1
100 TABLET, FILM COATED ORAL DAILY
Qty: 90 TABLET | Refills: 3 | Status: SHIPPED | OUTPATIENT
Start: 2022-08-24

## 2022-08-24 RX ORDER — AZITHROMYCIN 250 MG/1
TABLET, FILM COATED ORAL
Qty: 6 TABLET | Refills: 0 | Status: SHIPPED | OUTPATIENT
Start: 2022-08-24 | End: 2022-10-11 | Stop reason: SDUPTHER

## 2022-08-24 ASSESSMENT — ENCOUNTER SYMPTOMS
COUGH: 1
CHEST TIGHTNESS: 0
RHINORRHEA: 0
WHEEZING: 1
SHORTNESS OF BREATH: 1

## 2022-08-24 NOTE — PROGRESS NOTES
aSubjective:      Patient ID: Boni Yap is a 45 y.o. male. HPI: Follow Up    Chief Complaint   Patient presents with    Shortness of Breath     Worse over the last 1 week - pt having to use nebulizer and inhaler q 2 hrs. Panic Attack       Onset of 1 week with symptoms. Increase congestion in chest and nasal.  SOB. NEB tx with benefit. HA. Dizziness. No fever or chills. Body aches at times. <1/2 ppd smoking. Activity quitting. On Dulera BID and Spiriva. NEB tx PRN. Vitals:    08/24/22 0802   BP: (!) 140/92   Pulse: 74   Resp: 18   SpO2: 99%       Has had a lot of deaths this year - brothers, cousin and wife. He is coping ok per patient. Good support system. Poor sleep at night. Hard to get to sleep or back to sleep. Denies desire to go back on. Does feel he is doing well. Was placed on Zoloft 50 mg and Wellbutrin 150 mg. Improved mood. Helping him cope. Less irritable. Still feel like he is dealing with anxiety. Is able to calm himself down and breath. Patient Active Problem List   Diagnosis    Left inguinal hernia    Simple chronic bronchitis (HCC)    Chest wall pain following surgery    Empyema lung (HCC)    S/P lobectomy of lung    Histoplasmosis    Depression with anxiety    Intercostal neuralgia     COLONOSCOPY - due    Hx of seeing [facility]. Related to chronic intercostal pain s/p lobectomy in 2016. Was on Norco and Gabapentin. Had intercostal INJ with no benefits. Gabapentin was dosed up and he is noticing good benefit of his pain. Denies pain with coughing anymore. He is on Gabapentin 800 mg TID. BP Readings from Last 3 Encounters:   08/24/22 (!) 140/92   07/07/22 (!) 144/78   01/04/21 110/70     BP wnl.      Due for annual labs - has orders    No results found for: LABA1C  No results found for: EAG    No components found for: CHLPL  No results found for: TRIG  No results found for: HDL  No results found for: LDLCALC  No results found for: LABVLDL      Chemistry        Component Value Date/Time     11/16/2017 1504    K 4.5 11/16/2017 1504     11/16/2017 1504    CO2 31 11/16/2017 1504    BUN 14 11/16/2017 1504    CREATININE 1.1 11/16/2017 1504        Component Value Date/Time    CALCIUM 9.6 11/16/2017 1504    ALKPHOS 83 11/16/2017 1504    AST 21 11/16/2017 1504    ALT 14 11/16/2017 1504    BILITOT 0.5 11/16/2017 1504            No results found for: TSH, G8LYZFD, Z5EJCWL, THYROIDAB    Lab Results   Component Value Date    WBC 8.7 11/16/2017    HGB 14.5 11/16/2017    HCT 41.8 (L) 11/16/2017    MCV 84.9 11/16/2017     (H) 11/16/2017       Health Maintenance   Topic Date Due    COVID-19 Vaccine (1) Never done    Pneumococcal 0-64 years Vaccine (1 - PCV) Never done    HIV screen  Never done    Hepatitis C screen  Never done    DTaP/Tdap/Td vaccine (1 - Tdap) Never done    Lipids  Never done    Colorectal Cancer Screen  Never done    Shingles vaccine (1 of 2) Never done    Flu vaccine (1) 09/01/2022    Depression Monitoring  07/07/2023    Hepatitis A vaccine  Aged Out    Hepatitis B vaccine  Aged Out    Hib vaccine  Aged Out    Meningococcal (ACWY) vaccine  Aged Out       There is no immunization history for the selected administration types on file for this patient. Review of Systems   Constitutional:  Positive for fatigue. Negative for chills. HENT:  Positive for congestion. Negative for postnasal drip and rhinorrhea. Respiratory:  Positive for cough, shortness of breath and wheezing. Negative for chest tightness. Musculoskeletal:  Positive for arthralgias. Skin:  Negative for rash. Neurological:  Negative for light-headedness. Psychiatric/Behavioral:  Positive for sleep disturbance. Negative for dysphoric mood. The patient is nervous/anxious. Objective:   Physical Exam  Constitutional:       General: He is not in acute distress. Appearance: He is well-developed.    HENT:      Right Ear: Tympanic membrane normal.      Left Ear: Tympanic membrane normal.      Nose: No mucosal edema or rhinorrhea. Mouth/Throat:      Pharynx: No posterior oropharyngeal erythema. Cardiovascular:      Rate and Rhythm: Normal rate and regular rhythm. Pulses: Normal pulses. Heart sounds: Normal heart sounds, S1 normal and S2 normal. No murmur heard. No S3 sounds. Pulmonary:      Effort: Pulmonary effort is normal. No tachypnea, accessory muscle usage or respiratory distress. Breath sounds: Decreased breath sounds present. No wheezing or rhonchi. Chest:      Chest wall: Tenderness present. No crepitus. Abdominal:      General: Bowel sounds are normal.      Palpations: Abdomen is soft. Tenderness: There is no abdominal tenderness. Neurological:      Mental Status: He is alert and oriented to person, place, and time. Psychiatric:         Mood and Affect: Mood is anxious. Mood is not depressed. Speech: Speech normal.         Behavior: Behavior is hyperactive. Behavior is not slowed or withdrawn. Thought Content: Thought content is not paranoid. Judgment: Judgment normal.       Assessment:       Diagnosis Orders   1. COPD exacerbation (HCC)  azithromycin (ZITHROMAX Z-MIRA) 250 MG tablet    predniSONE (DELTASONE) 50 MG tablet      2. Cough        3. Smoking        4. Empyema lung ([facility] [address]        5. S/P lobectomy of lung        6. Intercostal neuralgia        7. Moderate episode of recurrent major depressive disorder (HCC)  sertraline (ZOLOFT) 100 MG tablet    buPROPion (WELLBUTRIN XL) 150 MG extended release tablet      8.  Dysfunctional grieving  sertraline (ZOLOFT) 100 MG tablet    buPROPion (WELLBUTRIN XL) 150 MG extended release tablet              Plan:      Orders as above   Fluids and rest  Continue NEB tx and INH  Increase Zoloft 100 mg to get better anxiety coverage  RTO if symptoms worsen or stay the same      Oneyda Canseco was evaluated today and a DME order was entered for a nebulizer compressor in order to administer Albuterol due to the diagnosis of COPD. The need for this equipment and treatment was discussed with the patient and he understands and is in agreement. Scarlet Thorpe received counseling on the following healthy behaviors: tobacco cessation    Patient given educational materials on Smoking Cessation    I have instructed Scarlet Thorpe to complete a self tracking handout on Smoking and instructed them to bring it with them to his next appointment. Discussed use, benefit, and side effects of prescribed medications. Barriers to medication compliance addressed. All patient questions answered. Pt voiced understanding.            LILIA Quezada - CNP

## 2022-09-01 ENCOUNTER — CARE COORDINATION (OUTPATIENT)
Dept: CARE COORDINATION | Age: 52
End: 2022-09-01

## 2022-09-01 ASSESSMENT — ENCOUNTER SYMPTOMS: DYSPNEA ASSOCIATED WITH: EXERTION

## 2022-09-01 NOTE — CARE COORDINATION
Pt reports that his nebulizer has been acting up. Does not work all the time. Pt is eligible for new nebulizer machine. Per chart appears last time pt received one was 3/2017. [facility] will need script with dx and OV notes supporting need. (Pt was seen 8/24). Please fax orders and notes to [phone]. Please advise.

## 2022-09-01 NOTE — TELEPHONE ENCOUNTER
Letter, NEB Rx were placed in fax bin to be fax this afternoon. Spoke with pt and daughter.  Pt states she has not been urinating as much the past few days.  Denies abd bloating, chest pain , dyspnea, or pain with urination.  States she has been hydrated.    Pt has CTA of the abd and pelvis tomorrow.  Advised to try to remain hydrated before and after CTA.  Advised to call if she notes any urinary retention or dysuria.  Pt and daughter v/u and agrees,

## 2022-09-01 NOTE — CARE COORDINATION
Ambulatory Care Coordination Note  9/1/2022    ACC: Patrice Guy, RN    Summary Note: Kellee Hinojosa is being followed by care coordination for education and assistance in managing his chronic conditions and assisting with healthcare needs. Pt has h/o histoplasmosis, depression, left lobectomy. Spoke with Kellee Hinojosa briefly today. COPD: pt was seen on 8/24. Prescribed prednisone and Z-yennifer d/t increased cough, SOB, congestion. Pt finished atb yesterday. Reports still having s/s but s/s have improved. Coughing up green sputum still. Continues to smoke. Encouraged to quit. Doing nebulizer tx's but reports to me today that his nebulizer machine is not working all the time. Reports sometimes it works and other times it dose not. Reviewed chart. Pt received nebulizer 3/2017 so he has had it over 5 yrs. Eligible for new machine. Requesting new order be sent to [facility]. Pt aware to call next wk if s/s do not continue to improve. Continue using Dulera and Spiriva as prescribed. Depression-having increased panic attacks. Zoloft was increased at visit last wk. Pt aware it may take a few wks before seeing improvement. Plan of care:  Message routed to PCP for new nebulizer. Reinforce COPD zones    Encourage smoking cessation  Frequent rest breaks with activity  Utilize PCP office for same day appts PRN  F/u with office if panic attacks /depression s/s do not improve with increased dose of Zoloft. Arrange appt with PULM once decides who he wants to follow with.    COPD Assessment    Does the patient understand envrionmental exposure?: No  Is the patient able to verbalize Rescue vs. Long Acting medications?: Yes  Does the patient have a nebulizer?: Yes            Symptoms:  None: Yes      Symptom course: stable  Breathlessness: exertion  Increase use of rapid acting/rescue inhaled medications?: No  Change in chronic cough?: Increased  Change in sputum?: Increased  Sputum characteristics: [place]  Have you had a recent diagnosis of pneumonia either by PCP or at a hospital?: No         Lab Results       None            Care Coordination Interventions    Referral from Primary Care Provider: No  Suggested Interventions and Community Resources  Zone Management Tools: In Process          Goals Addressed                      This Visit's Progress      Conditions and Symptoms   On track      I will schedule office visits, as directed by my provider. I will keep my appointment or reschedule if I have to cancel. I will notify my provider of any barriers to my plan of care. I will follow my Zone Management tool to seek urgent or emergent care. I will notify my provider of any symptoms that indicate a worsening of my condition. COPD    Barriers: lack of support, overwhelmed by complexity of regimen, stress, and lack of education  Plan for overcoming my barriers: [facility] and [facility], [facility], COPD zone tools  Confidence: 8/10  Anticipated Goal Completion Date: 10/18/22          Establish options for affordable food sources (pt-stated)   On track      Mailed pt food pantry list and [employer] phone #              Prior to Admission medications    Medication Sig Start Date End Date Taking?  Authorizing Provider   sertraline (ZOLOFT) 100 MG tablet Take 1 tablet by mouth daily 8/24/22   LILIA Mcelroy CNP   buPROPion (WELLBUTRIN XL) 150 MG extended release tablet Take 1 tablet by mouth every morning 8/24/22   LILIA Mcelroy CNP   azithromycin (ZITHROMAX Z-MIRA) 250 MG tablet As directed 8/24/22 9/3/22  LILIA Mcelroy CNP   doxepin [facility]) 25 MG capsule Take 1 capsule by mouth nightly 7/7/22   LILIA Mcelroy CNP   gabapentin (NEURONTIN) 800 MG tablet take 1 tablet by mouth four times a day 3/1/22 8/28/22  LILIA Mcelroy CNP   [place] 200-5 MCG/ACT inhaler inhale 2 puffs by mouth and INTO THE LUNGS twice a day 2/8/22   LILIA Mcelroy CNP   tiotropium (SPIRIVA RESPIMAT) 2.5 MCG/ACT AERS inhaler Inhale 2 puffs into the lungs daily 6/30/20   LILIA Barth CNP   albuterol sulfate  (90 Base) MCG/ACT inhaler Inhale 2 puffs into the lungs every 6 hours as needed for Wheezing 5/27/20   LILIA Barth CNP   ipratropium-albuterol (DUONEB) 0.5-2.5 (3) MG/3ML SOLN nebulizer solution Inhale 1 vial into the lungs every 4 hours    Historical Provider, MD   acetaminophen (TYLENOL) 325 MG tablet Take 2 tablets by mouth every 4 hours as needed for Pain or Fever 2/28/17   Maira Floyd MD   Respiratory Therapy Supplies (NEBULIZER COMPRESSOR) KIT 1 kit by Does not apply route once for 1 dose 2/24/17 11/20/17  LILIA Barth CNP       No future appointments.

## 2022-09-08 ENCOUNTER — CARE COORDINATION (OUTPATIENT)
Dept: CARE COORDINATION | Age: 52
End: 2022-09-08

## 2022-09-08 NOTE — CARE COORDINATION
Attempted to reach patient for continued Care Coordination follow up and education. Patient was unavailable at the time of my call, and voicemail is not set up.

## 2022-09-16 ENCOUNTER — CARE COORDINATION (OUTPATIENT)
Dept: CARE COORDINATION | Age: 52
End: 2022-09-16

## 2022-09-16 DIAGNOSIS — J86.9 EMPYEMA LUNG (HCC): ICD-10-CM

## 2022-09-16 DIAGNOSIS — J41.0 SIMPLE CHRONIC BRONCHITIS (HCC): Primary | ICD-10-CM

## 2022-09-16 DIAGNOSIS — Z90.2 S/P LOBECTOMY OF LUNG: ICD-10-CM

## 2022-09-16 ASSESSMENT — ENCOUNTER SYMPTOMS: DYSPNEA ASSOCIATED WITH: EXERTION

## 2022-09-16 NOTE — CARE COORDINATION
Ambulatory Care Coordination Note  9/16/2022    ACC: Ivonne Montgomery RN    Summary Note: Jen Goel is being followed by care coordination for education and assistance in managing his chronic conditions and assisting with healthcare needs. Pt has h/o histoplasmosis, depression, left lobectomy. Spoke with Jen Goel briefly today. COPD: pt obtained new nebulizer. Very appreciative of new device. Discussed getting aligned with PULM doctor. Pt agreeable in referral to Dr. Gloria Yeh. Will send request to PCP. On Dulera, Spiriva, Duonebs. No new cough or congestion. Pt asking about AEP form for electric. Informed him that he can obtain at [facility] and drop off at office for completion. Depression: on Wellbutrin, Zoloft  Having some diarrhea today. Unsure what is causing it. Had it a couple wks ago too. Advised to monitor and to call if it persists. Plan of care:  Referral request sent to PCP  Obtain AEP form and drop off at office for completion  Continue taking meds as prescribed  Call if diarrhea persists. COPD Assessment    Does the patient understand envrionmental exposure?: No  Is the patient able to verbalize Rescue vs. Long Acting medications?: Yes  Does the patient have a nebulizer?: Yes            Symptoms:  None: Yes      Symptom course: stable  Breathlessness: exertion  Increase use of rapid acting/rescue inhaled medications?: No  Change in chronic cough?: No/At Baseline  Change in sputum?: No/At Baseline  Self Monitoring - SaO2: No  Have you had a recent diagnosis of pneumonia either by PCP or at a hospital?: No         Lab Results       None            Care Coordination Interventions    Referral from Primary Care Provider: No  Suggested Interventions and Community Resources  Zone Management Tools: In Process          Goals Addressed                      This Visit's Progress      Conditions and Symptoms   On track      I will schedule office visits, as directed by my provider.   I will keep my appointment or reschedule if I have to cancel. I will notify my provider of any barriers to my plan of care. I will follow my Zone Management tool to seek urgent or emergent care. I will notify my provider of any symptoms that indicate a worsening of my condition. COPD    Barriers: lack of support, overwhelmed by complexity of regimen, stress, and lack of education  Plan for overcoming my barriers: support and education from [facility], PCP, COPD zone tools  Confidence: 8/10  Anticipated Goal Completion Date: 10/18/22          Establish options for affordable food sources (pt-stated)   On track      Mailed pt food pantry list and [employer] phone #              Prior to Admission medications    Medication Sig Start Date End Date Taking?  Authorizing Provider   sertraline (ZOLOFT) 100 MG tablet Take 1 tablet by mouth daily 8/24/22   LILIA Lino CNP   buPROPion (WELLBUTRIN XL) 150 MG extended release tablet Take 1 tablet by mouth every morning 8/24/22   LILIA Lino CNP   doxepin [facility]) 25 MG capsule Take 1 capsule by mouth nightly 7/7/22   LILIA Lino CNP   gabapentin (NEURONTIN) 800 MG tablet take 1 tablet by mouth four times a day 3/1/22 8/28/22  LILIA Lino CNP   [place] 200-5 MCG/ACT inhaler inhale 2 puffs by mouth and INTO THE LUNGS twice a day 2/8/22   LILIA Lino CNP   tiotropium (SPIRIVA RESPIMAT) 2.5 MCG/ACT AERS inhaler Inhale 2 puffs into the lungs daily 6/30/20   LILIA Lino CNP   albuterol sulfate  (90 Base) MCG/ACT inhaler Inhale 2 puffs into the lungs every 6 hours as needed for Wheezing 5/27/20   LILIA Lino CNP   ipratropium-albuterol (DUONEB) 0.5-2.5 (3) MG/3ML SOLN nebulizer solution Inhale 1 vial into the lungs every 4 hours    Historical Provider, MD   acetaminophen (TYLENOL) 325 MG tablet Take 2 tablets by mouth every 4 hours as needed for Pain or Fever 2/28/17   Nina Allen MD   Respiratory Therapy Supplies (NEBULIZER COMPRESSOR) KIT 1 kit by Does not apply route once for 1 dose 2/24/17 11/20/17  LILIA Menjivar - CNP       No future appointments.

## 2022-09-16 NOTE — CARE COORDINATION
Pt agreeable in getting established with PULM. Please send referral to Dr. Wei Hunter. Please advise.

## 2022-09-26 ENCOUNTER — TELEPHONE (OUTPATIENT)
Dept: FAMILY MEDICINE CLINIC | Age: 52
End: 2022-09-26

## 2022-09-26 NOTE — TELEPHONE ENCOUNTER
Patient notified and understanding voiced. He will contact AEP again. He is aware if not received prior to noon it will not be filled out until tomorrow.

## 2022-09-29 ENCOUNTER — CARE COORDINATION (OUTPATIENT)
Dept: CARE COORDINATION | Age: 52
End: 2022-09-29

## 2022-09-29 NOTE — CARE COORDINATION
Referral faxed to Dr. Brandon [facility] [phone], they will contact pt to schedule.

## 2022-10-11 ENCOUNTER — TELEMEDICINE (OUTPATIENT)
Dept: FAMILY MEDICINE CLINIC | Age: 52
End: 2022-10-11
Payer: MEDICARE

## 2022-10-11 ENCOUNTER — CARE COORDINATION (OUTPATIENT)
Dept: CARE COORDINATION | Age: 52
End: 2022-10-11

## 2022-10-11 DIAGNOSIS — Z90.2 S/P LOBECTOMY OF LUNG: ICD-10-CM

## 2022-10-11 DIAGNOSIS — F17.200 SMOKING: ICD-10-CM

## 2022-10-11 DIAGNOSIS — J44.1 COPD EXACERBATION (HCC): ICD-10-CM

## 2022-10-11 DIAGNOSIS — J41.0 SIMPLE CHRONIC BRONCHITIS (HCC): Primary | ICD-10-CM

## 2022-10-11 PROCEDURE — 98967 PH1 ASSMT&MGMT NQHP 11-20: CPT | Performed by: NURSE PRACTITIONER

## 2022-10-11 RX ORDER — AZITHROMYCIN 250 MG/1
TABLET, FILM COATED ORAL
Qty: 6 TABLET | Refills: 0 | Status: SHIPPED | OUTPATIENT
Start: 2022-10-11 | End: 2022-10-21

## 2022-10-11 RX ORDER — PREDNISONE 50 MG/1
50 TABLET ORAL DAILY
Qty: 5 TABLET | Refills: 0 | Status: SHIPPED | OUTPATIENT
Start: 2022-10-11 | End: 2022-10-16

## 2022-10-11 ASSESSMENT — ENCOUNTER SYMPTOMS
ABDOMINAL PAIN: 0
WHEEZING: 1
SHORTNESS OF BREATH: 1
DYSPNEA ASSOCIATED WITH: EXERTION
COUGH: 1
NAUSEA: 0

## 2022-10-11 NOTE — CARE COORDINATION
Ambulatory Care Coordination Note  10/11/2022    ACC: Jena Jones, RN    Toña Peterson is being followed by care coordination for education and assistance in managing his chronic conditions. Pt has h/o histoplasmosis, depression, left lobectomy. Spoke with Toña Peterson briefly today. COPD: pt has been using new nebulizer. Pt reports for last 3 days he has not been feeling well. Increased SOB, cough producing green colored sputum. Not able to ride his bike to office for appt. Not able to complete VV. Will reach out to PCP to update on pt's s/s. Last time pt had steroid and atb was 8/24. Pt did not go in to work today b/c of how he was feeling. Has not heard from [facility] office re: referral that was sent. Will f/u. Emotional on phone. Denies thoughts of harming self or others. Provided pt with 9-8-8 number to call if needing to talk with someone. Reports compliance with Wellbutrin and Zoloft. Offered patient enrollment in the Remote Patient Monitoring (RPM) program for in-home monitoring: NA.  Plan of care:  Reinforce importance of early symptom recognition and reporting.    Reinforce COPD zones  Message routed to PCP re: pt's s/s  Call placed to [facility] re: referral sent over on 9/28  COPD Assessment    Does the patient understand envrionmental exposure?: No  Is the patient able to verbalize Rescue vs. Long Acting medications?: Yes  Does the patient have a nebulizer?: Yes            Symptoms:  COPD associated increased fatigue: Pos      Symptom course: stable  Breathlessness: exertion  Increase use of rapid acting/rescue inhaled medications?: Yes  Change in chronic cough?: Increased  Change in sputum?: Increased  Sputum characteristics: Green  Self Monitoring - SaO2: No  Have you had a recent diagnosis of pneumonia either by PCP or at a hospital?: No         Lab Results       None            Care Coordination Interventions    Referral from Primary Care Provider: No  Suggested Interventions and [employer]  Zone Management Tools: In Process          Goals Addressed    None         Prior to Admission medications    Medication Sig Start Date End Date Taking? Authorizing Provider   sertraline (ZOLOFT) 100 MG tablet Take 1 tablet by mouth daily 8/24/22   LILIA Donato CNP   buPROPion (WELLBUTRIN XL) 150 MG extended release tablet Take 1 tablet by mouth every morning 8/24/22   LILIA Donato CNP   doxepin [facility]) 25 MG capsule Take 1 capsule by mouth nightly 7/7/22   LILIA Donato CNP   gabapentin (NEURONTIN) 800 MG tablet take 1 tablet by mouth four times a day 3/1/22 8/28/22  LILIA Donato CNP   [place] 200-5 MCG/ACT inhaler inhale 2 puffs by mouth and INTO THE LUNGS twice a day 2/8/22   LILIA Donato CNP   tiotropium (SPIRIVA RESPIMAT) 2.5 MCG/ACT AERS inhaler Inhale 2 puffs into the lungs daily 6/30/20   LILIA Donato CNP   albuterol sulfate  (90 Base) MCG/ACT inhaler Inhale 2 puffs into the lungs every 6 hours as needed for Wheezing 5/27/20   LILIA Donato CNP   ipratropium-albuterol (DUONEB) 0.5-2.5 (3) MG/3ML SOLN nebulizer solution Inhale 1 vial into the lungs every 4 hours    Historical Provider, MD   acetaminophen (TYLENOL) 325 MG tablet Take 2 tablets by mouth every 4 hours as needed for Pain or Fever 2/28/17   Armando Colorado MD   Respiratory Therapy Supplies (NEBULIZER COMPRESSOR) KIT 1 kit by Does not apply route once for 1 dose 2/24/17 11/20/17  LILIA Donato CNP       No future appointments.

## 2022-10-11 NOTE — CARE COORDINATION
Spoke with Cara Iqbal today. Pt has been having increased SOB, increased cough producing green sputum for last 3 days. Pt reports that he has been doing his nebulizer tx's t/o the day. Did not go to work today d/t not feeling well. Pt reports that he has no way to get to office for an appt and is not able to complete VV. Please review and advise.

## 2022-10-11 NOTE — PROGRESS NOTES
Ebony Boland is a 45 y.o. male evaluated via telephone on 10/11/2022 for COPD (Exacerbation)  . Chief Complaint   Patient presents with    COPD     Exacerbation       Pt has been having increased SOB, increased cough producing green sputum for last 3 days. Pt reports that he has been doing his nebulizer tx's t/o the day with relief. Wheezing. Did not go to work today d/t not feeling well.      <1/2 ppd smoking. Activity quitting. On Dulera BID and Spiriva. NEB tx PRN. Review of Systems   Constitutional:  Positive for fatigue. Negative for chills and fever. HENT:  Positive for congestion. Respiratory:  Positive for cough, shortness of breath and wheezing. Cardiovascular:  Negative for chest pain. Gastrointestinal:  Negative for abdominal pain and nausea. Skin:  Negative for rash. Neurological:  Positive for headaches. Negative for dizziness and light-headedness. Psychiatric/Behavioral: Negative. Diagnosis Orders   1. Simple chronic bronchitis ([address]  Kai Townsend MD, Pulmonology, [place]    XR CHEST (2 VW)    Full PFT Study With Bronchodilator      2. COPD exacerbation (HCC)  azithromycin (ZITHROMAX Z-MIRA) 250 MG tablet    predniSONE (DELTASONE) 50 MG tablet      3. S/P lobectomy of lung  Kai Townsend MD, Pulmonology, [place]    XR CHEST (2 VW)    Full PFT Study With Bronchodilator      4. Smoking  Kai Townsend [facility], [facility]    XR CHEST (2 VW)    Full PFT Study With Bronchodilator            MDM: Prednisone + ZPAK   Continue NEB and INH   Set up with PULM for future   PFT and CXR   RTO PRN    Total Time: minutes: 11-20 minutes    Ebony Boland was evaluated through a synchronous (real-time) audio encounter. Patient identification was verified at the start of the visit. He (or guardian if applicable) is aware that this is a billable service, which includes applicable co-pays.  This visit was conducted with the patient's (and/or legal guardian's) verbal consent. He has not had a related appointment within my department in the past 7 days or scheduled within the next 24 hours. The patient was located at Home: [address] [place] [address]. The provider was located at [facility] (Appt Dept): [address]  [facility] [place],  [address]     Note: not billable if this call serves to triage the patient into an appointment for the relevant concern    Mireya Haddad, APRN - CNP

## 2022-10-17 ENCOUNTER — TELEPHONE (OUTPATIENT)
Dept: FAMILY MEDICINE CLINIC | Age: 52
End: 2022-10-17

## 2022-10-17 NOTE — TELEPHONE ENCOUNTER
Lowry Olszewski with [facility] called office stating they had received the referral from our office. They have made numerous attempts to contact pt, left messages with no return call. They will defer pt back to us and send pt a letter. TATIANAI.

## 2022-10-17 NOTE — TELEPHONE ENCOUNTER
Attempted to contact pt [phone], no answer and unable to leave a vm due to no mailbox set up yet.

## 2022-10-17 NOTE — TELEPHONE ENCOUNTER
Pt called office stating he will give [facility] a call to try and schedule an appt.

## 2022-10-18 ENCOUNTER — CARE COORDINATION (OUTPATIENT)
Dept: CARE COORDINATION | Age: 52
End: 2022-10-18

## 2022-10-18 ASSESSMENT — ENCOUNTER SYMPTOMS: DYSPNEA ASSOCIATED WITH: EXERTION

## 2022-10-18 NOTE — CARE COORDINATION
Ambulatory Care Coordination Note  10/18/2022    ACC: Marichuy Asencio, RN    Mohamud Montana is being followed by care coordination for education and assistance in managing his chronic conditions. Pt has h/o histoplasmosis, depression, left lobectomy. Spoke with Mohamud Montana briefly today. Pt reports that breathing is about the same. Pt has PFT's scheduled for 10/26. He will obtain chest xray at that time. Pt misunderstood and thought he would be seeing doctor same day. Pt  has not contacted [facility] yet. I provided pt with office number and advised him to call today to arrange appt. Verbalizes understanding. Continues to do breathing tx's. Plan of care:  F/u with [facility] PULM. Arrange new pt appt  Complete PFT and chest xray on 10/26  Encourage smoking cessation  Frequent rest breaks  Reinforce COPD zones. Offered patient enrollment in the Remote Patient Monitoring (RPM) program for in-home monitoring: . COPD Assessment    Does the patient understand envrionmental exposure?: No  Is the patient able to verbalize Rescue vs. Long Acting medications?: Yes  Does the patient have a nebulizer?: Yes            Symptoms:  None: Yes      Symptom course: stable  Breathlessness: exertion  Increase use of rapid acting/rescue inhaled medications?: No  Change in chronic cough?: No/At Baseline  Change in sputum?: No/At Baseline  Self Monitoring - SaO2: No  Have you had a recent diagnosis of pneumonia either by PCP or at a hospital?: No         Lab Results       None            Care Coordination Interventions    Referral from Primary Care Provider: No  Suggested Interventions and Community Resources  Zone Management Tools: Completed          Goals Addressed                   This Visit's Progress     Conditions and Symptoms   Improving     I will schedule office visits, as directed by my provider. I will keep my appointment or reschedule if I have to cancel. I will notify my provider of any barriers to my plan of care.   I will follow my Zone Management tool to seek urgent or emergent care. I will notify my provider of any symptoms that indicate a worsening of my condition. COPD    Barriers: lack of support, overwhelmed by complexity of regimen, stress, and lack of education  Plan for overcoming my barriers: support and education from [facility], PCP, COPD zone tools  Confidence: 8/10  Anticipated Goal Completion Date: 10/18/22                Prior to Admission medications    Medication Sig Start Date End Date Taking?  Authorizing Provider   azithromycin (ZITHROMAX Z-MIRA) 250 MG tablet As directed 10/11/22 10/21/22  LILIA Doherty CNP   sertraline (ZOLOFT) 100 MG tablet Take 1 tablet by mouth daily 8/24/22   LILIA Doherty CNP   buPROPion (WELLBUTRIN XL) 150 MG extended release tablet Take 1 tablet by mouth every morning 8/24/22   LILIA Doherty CNP   doxepin [facility]) 25 MG capsule Take 1 capsule by mouth nightly 7/7/22   LILIA Doherty CNP   gabapentin (NEURONTIN) 800 MG tablet take 1 tablet by mouth four times a day 3/1/22 8/28/22  LILIA Doherty CNP   Vaughn Jobs 200-5 MCG/ACT inhaler inhale 2 puffs by mouth and INTO THE LUNGS twice a day 2/8/22   LILIA Doherty CNP   tiotropium (SPIRIVA RESPIMAT) 2.5 MCG/ACT AERS inhaler Inhale 2 puffs into the lungs daily 6/30/20   LILIA Doherty CNP   albuterol sulfate  (90 Base) MCG/ACT inhaler Inhale 2 puffs into the lungs every 6 hours as needed for Wheezing 5/27/20   LILIA Doherty CNP   ipratropium-albuterol (DUONEB) 0.5-2.5 (3) MG/3ML SOLN nebulizer solution Inhale 1 vial into the lungs every 4 hours    Historical Provider, MD   acetaminophen (TYLENOL) 325 MG tablet Take 2 tablets by mouth every 4 hours as needed for Pain or Fever 2/28/17   Good [place] MD   Respiratory Therapy Supplies (NEBULIZER COMPRESSOR) KIT 1 kit by Does not apply route once for 1 dose 2/24/17 11/20/17  LILIA Doherty - CNP       Future Appointments Date Time Provider Elsa Aleman   10/26/2022 10:00 AM STR PULMONARY FUNCTION ROOM 1 [facility] PFT TASH KEYES [place]

## 2022-10-20 ENCOUNTER — CLINICAL DOCUMENTATION (OUTPATIENT)
Dept: SPIRITUAL SERVICES | Facility: CLINIC | Age: 52
End: 2022-10-20

## 2022-10-20 NOTE — FLOWSHEET NOTE
[place] [id number] PROGRESS NOTE        Patient: Bethany Villarreal      YOB: 1970  Age: 45 y.o. Gender: male                    Assessment:  Jemma Magana is a 57-year-old male who is being referred for grief support and life-adjustment due to what is currently being experienced in his life. *  Per referral, grief support due to recent deaths and multiple losses in his life. Interventions:   made a final attempt to contact Jemma Magana via telephone call to offer support, if desired, due to what is currently being experienced in his life. Outcomes:  No answer. No voicemail mailbox set up. No message could be left. Plan:  1.  sent a follow-up letter, brochure, and contact information to assist moving forward. 2.  remains available to assist as needed.      Electronically signed by Yuriy Zamora on 10/20/2022 at 12:52 PM.  [facility]  [phone]

## 2022-10-20 NOTE — PROGRESS NOTES
[place] [address] PROGRESS NOTE      Patient: Wendy Her      YOB: 1970  Age: 45 y.o. Gender: male            Assessment:  Divya Goldsmith is a 75-year-old male who is being referred for grief support and life-adjustment due to what is currently being experienced in his life. *  Per referral, grief support due to recent deaths and multiple losses in his life. Interventions:   made a final attempt to contact Divya Goldsmith via telephone call to offer support, if desired, due to what is currently being experienced in his life. Outcomes:  No answer. No voicemail mailbox set up. No message could be left. Plan:  1.  sent a follow-up letter, brochure, and contact information to assist moving forward. 2.  remains available to assist as needed.     Electronically signed by Daniel Madera, on 10/20/2022 at 12:52 PM.  [facility]  [phone]

## 2022-10-28 ENCOUNTER — CARE COORDINATION (OUTPATIENT)
Dept: CARE COORDINATION | Age: 52
End: 2022-10-28

## 2022-11-09 ENCOUNTER — CARE COORDINATION (OUTPATIENT)
Dept: CARE COORDINATION | Age: 52
End: 2022-11-09

## 2022-11-09 ENCOUNTER — TELEPHONE (OUTPATIENT)
Dept: FAMILY MEDICINE CLINIC | Age: 52
End: 2022-11-09

## 2022-11-09 DIAGNOSIS — F17.200 SMOKING: ICD-10-CM

## 2022-11-09 DIAGNOSIS — J41.0 SIMPLE CHRONIC BRONCHITIS (HCC): Primary | ICD-10-CM

## 2022-11-09 DIAGNOSIS — Z90.2 S/P LOBECTOMY OF LUNG: ICD-10-CM

## 2022-11-09 NOTE — TELEPHONE ENCOUNTER
Received a call from Samuel with [phone] [facility] stating that they received the referral for this patient and that he had been dismissed from their practice and they will not be seeing him.

## 2022-11-09 NOTE — TELEPHONE ENCOUNTER
Referral to Dr. Corona [facility] pulmonary faxed to his office at [phone] for dx of simple chronic bronchitis, S/P lobectomy of lung and smoking. PULM will call patient to schedule. Attempted to contact patient phone rings busy.   Per provider patient is to keep PFT testing as scheduled with MARAH CHAIREZ on 11/14/22 at 8 am.

## 2022-11-09 NOTE — CARE COORDINATION
Attempted to reach Prisca Hector today for care coordination f/u. Received recording that states call cannot be completed as dialed. Attempted again. Same recording.

## 2022-11-14 ENCOUNTER — TELEPHONE (OUTPATIENT)
Dept: FAMILY MEDICINE CLINIC | Age: 52
End: 2022-11-14

## 2022-11-14 NOTE — TELEPHONE ENCOUNTER
----- Message from [address] sent at 11/14/2022 10:10 AM EST -----  Subject: Referral Request    Reason for referral request? Argentina Aguilar from Dr. Tita Ahn office has been   trying to get a hold of Syed Lawson since the first of October. She said she is   leaving voice mails with no return calls. If Syed Lawson calls please refer him   to dr. Caden Francis office. Thank you  Provider patient wants to be referred to(if known):     Provider Phone Number(if known):     Additional Information for Provider?   ---------------------------------------------------------------------------  --------------  [address]    [phone]; OK to leave message on voicemail  ---------------------------------------------------------------------------  --------------

## 2022-11-14 NOTE — TELEPHONE ENCOUNTER
----- Message from [address] sent at 11/14/2022 10:10 AM EST -----  Subject: Referral Request    Reason for referral request? Rolando Webster from Dr. Robson Christianson office has been   trying to get a hold of Toñalesly Peterson since the first of October. She said she is   leaving voice mails with no return calls. If Toña Peterson calls please refer him   to dr. Welch Or office. Thank you  Provider patient wants to be referred to(if known):     Provider Phone Number(if known):     Additional Information for Provider?   ---------------------------------------------------------------------------  --------------  [address] [employer]    [phone]; OK to leave message on voicemail  ---------------------------------------------------------------------------  --------------

## 2022-11-15 NOTE — TELEPHONE ENCOUNTER
No return call from patient, sister or daughter Artie Rangel. My chart message sent. Okay to close encounter?

## 2022-11-15 NOTE — TELEPHONE ENCOUNTER
Attempted to contact sister Belkis Romero on HIPAA, NA, VM is full. Attempted to contact daughter Shima Steward NOT on HIPAA . LVM to have patient to call the office.

## 2022-11-18 ENCOUNTER — CARE COORDINATION (OUTPATIENT)
Dept: CARE COORDINATION | Age: 52
End: 2022-11-18

## 2022-11-18 NOTE — CARE COORDINATION
Attempted to reach Divya Goldsmith for f/u. Received recording \"welcome to [facility] your call cannot be completed as dialed. \"   Attempted again. Received same message. My chart message sent.

## 2022-11-22 ENCOUNTER — TELEPHONE (OUTPATIENT)
Dept: FAMILY MEDICINE CLINIC | Age: 52
End: 2022-11-22

## 2022-11-22 DIAGNOSIS — F43.0 ACUTE STRESS REACTION: Primary | ICD-10-CM

## 2022-11-22 RX ORDER — HYDROXYZINE HYDROCHLORIDE 25 MG/1
25 TABLET, FILM COATED ORAL EVERY 8 HOURS PRN
Qty: 30 TABLET | Refills: 1 | Status: SHIPPED | OUTPATIENT
Start: 2022-11-22 | End: 2022-12-02

## 2022-11-29 ENCOUNTER — CARE COORDINATION (OUTPATIENT)
Dept: CARE COORDINATION | Age: 52
End: 2022-11-29

## 2022-11-29 ASSESSMENT — ENCOUNTER SYMPTOMS: DYSPNEA ASSOCIATED WITH: EXERTION

## 2022-11-29 NOTE — CARE COORDINATION
Ambulatory Care Coordination Note  11/29/2022    ACC: Javi Burgess, RN    Jemma Magana is being followed by care coordination for education and assistance in managing his chronic conditions. Pt has h/o histoplasmosis, depression, left lobectomy. Spoke with Jemma Magana briefly today. Pt reports that he lost his job in Oct.  Currently looking for a new job. Recently prescribed hydroxyzine to help with his anxiety. Pt reports that it is helping. Pt was referred to Dr. Quincy San but office was not able to reach him. I provided pt with their office number today and advised to call as soon as possible to arrange appt. Pt no showed for PFT's. Aware that he needs to reschedule as well. Informed pt that he will need to have them done prior to PULM appt. Verbalizes understanding. Reports that breathing is at baseline. Productive cough producing yellow/green sputum which has been ongoing for pt. Has duonebs and is using PRN. On Dulera and Spiriva  Continues to smoke. Encouraged cessation. D/t loss of job he reports that money has been very tight. Encouraged to use food resources that were previously given to him. Pt is aware that he can receive another food box at PCP office too if needed. Pt plans on talking with [facility] regarding assistance with his bills. Plan of care:  Reschedule PFT's  Call PULM office-Dr. Murphy Forward and arrange appt  Reinforced importance of smoking cessation  Reinforce COPD zones  Utilize food resources  F/u with [facility] to see about assistance with bills while in between jobs.      COPD Assessment    Does the patient understand envrionmental exposure?: No  Is the patient able to verbalize Rescue vs. Long Acting medications?: Yes  Does the patient have a nebulizer?: Yes            Symptoms:  None: Yes      Symptom course: stable  Breathlessness: exertion  Increase use of rapid acting/rescue inhaled medications?: No  Change in chronic cough?: No/At Baseline  Change in sputum?: No/At Baseline  Sputum characteristics: Yellow  Self Monitoring - SaO2: No  Have you had a recent diagnosis of pneumonia either by PCP or at a hospital?: No         Offered patient enrollment in the Remote Patient Monitoring (RPM) program for in-home monitoring: NA. Lab Results       None            Care Coordination Interventions    Referral from Primary Care Provider: No  Suggested Interventions and Community Resources  Zone Management Tools: Completed          Goals Addressed                      This Visit's Progress      Conditions and Symptoms         I will schedule office visits, as directed by my provider. I will keep my appointment or reschedule if I have to cancel. I will notify my provider of any barriers to my plan of care. I will follow my Zone Management tool to seek urgent or emergent care. I will notify my provider of any symptoms that indicate a worsening of my condition. COPD    Barriers: lack of support, overwhelmed by complexity of regimen, stress, and lack of education  Plan for overcoming my barriers: support and education from [facility], PCP, COPD zone tools  Confidence: 8/10  Anticipated Goal Completion Date: 10/18/22  Goal not met. Pt referred to Dr. Shira Ward. Has not arranged appt at this time. Has not completed PFT's. New goal date : 12/18/22  Plan for overcoming barriers-pt just obtained new phone. Provided him with office phone number. Needs to reschedule PFT's. COMPLETED: Establish options for affordable food sources (pt-stated)         Mailed pt food pantry list and [employer] phone #              Prior to Admission medications    Medication Sig Start Date End Date Taking?  Authorizing Provider   hydrOXYzine HCl (ATARAX) 25 MG tablet Take 1 tablet by mouth every 8 hours as needed for Anxiety 11/22/22 12/2/22  LILIA Ceja CNP   sertraline (ZOLOFT) 100 MG tablet Take 1 tablet by mouth daily 8/24/22   LILIA Ceja CNP   buPROPion (WELLBUTRIN XL) 150 MG extended release tablet Take 1 tablet by mouth every morning 8/24/22   Francisca Hodgkins, APRN - CNP   doxepin [facility]) 25 MG capsule Take 1 capsule by mouth nightly 7/7/22   Francisca Hodgkins, APRN - CNP   gabapentin (NEURONTIN) 800 MG tablet take 1 tablet by mouth four times a day 3/1/22 8/28/22  Francisca Hodgkins, APRN - CNP   Rabia Neat 200-5 MCG/ACT inhaler inhale 2 puffs by mouth and INTO THE LUNGS twice a day 2/8/22   Francisca Hodgkins, APRN - CNP   tiotropium (SPIRIVA RESPIMAT) 2.5 MCG/ACT AERS inhaler Inhale 2 puffs into the lungs daily 6/30/20   Francisca Hodgkins, APRN - CNP   albuterol sulfate  (90 Base) MCG/ACT inhaler Inhale 2 puffs into the lungs every 6 hours as needed for Wheezing 5/27/20   Francisca Hodgkins, APRN - CNP   ipratropium-albuterol (DUONEB) 0.5-2.5 (3) MG/3ML SOLN nebulizer solution Inhale 1 vial into the lungs every 4 hours    Historical Provider, MD   acetaminophen (TYLENOL) 325 MG tablet Take 2 tablets by mouth every 4 hours as needed for Pain or Fever 2/28/17   Zainab Darling MD   Respiratory Therapy Supplies (NEBULIZER COMPRESSOR) KIT 1 kit by Does not apply route once for 1 dose 2/24/17 11/20/17  Francisca Hodgkins, APRN - CNP       No future appointments.

## 2022-12-03 DIAGNOSIS — G58.8 INTERCOSTAL NEURALGIA: ICD-10-CM

## 2022-12-05 RX ORDER — GABAPENTIN 800 MG/1
TABLET ORAL
Qty: 120 TABLET | Refills: 5 | Status: SHIPPED | OUTPATIENT
Start: 2022-12-05 | End: 2023-06-03

## 2022-12-13 ENCOUNTER — CARE COORDINATION (OUTPATIENT)
Dept: CARE COORDINATION | Age: 52
End: 2022-12-13

## 2022-12-21 ENCOUNTER — CARE COORDINATION (OUTPATIENT)
Dept: CARE COORDINATION | Age: 52
End: 2022-12-21

## 2023-01-03 ENCOUNTER — CARE COORDINATION (OUTPATIENT)
Dept: CARE COORDINATION | Age: 53
End: 2023-01-03

## 2023-01-03 NOTE — CARE COORDINATION
Pt left message. Attempted to return call. No answer. My Chart message sent.   Will d/c from care coordination at this time as I have not been able to reach pt last 4 attempts

## 2023-01-09 ENCOUNTER — TELEPHONE (OUTPATIENT)
Dept: FAMILY MEDICINE CLINIC | Age: 53
End: 2023-01-09

## 2023-01-09 NOTE — TELEPHONE ENCOUNTER
Pt called office stating he will be dropping off paperwork for his unemployment. Pt got fired from his job because \"of my illness\". The paperwork wants to know what illness pt has, how long you have been treating him for this. Pt would like a call back when they are complete.

## 2023-01-10 ENCOUNTER — TELEMEDICINE (OUTPATIENT)
Dept: FAMILY MEDICINE CLINIC | Age: 53
End: 2023-01-10
Payer: MEDICARE

## 2023-01-10 DIAGNOSIS — G58.8 INTERCOSTAL NEURALGIA: ICD-10-CM

## 2023-01-10 DIAGNOSIS — F17.200 SMOKING: ICD-10-CM

## 2023-01-10 DIAGNOSIS — Z90.2 S/P LOBECTOMY OF LUNG: ICD-10-CM

## 2023-01-10 DIAGNOSIS — J41.0 SIMPLE CHRONIC BRONCHITIS (HCC): Primary | ICD-10-CM

## 2023-01-10 PROCEDURE — 3017F COLORECTAL CA SCREEN DOC REV: CPT | Performed by: NURSE PRACTITIONER

## 2023-01-10 PROCEDURE — 99213 OFFICE O/P EST LOW 20 MIN: CPT | Performed by: NURSE PRACTITIONER

## 2023-01-10 PROCEDURE — G8427 DOCREV CUR MEDS BY ELIG CLIN: HCPCS | Performed by: NURSE PRACTITIONER

## 2023-01-10 ASSESSMENT — COPD QUESTIONNAIRES: COPD: 1

## 2023-01-10 ASSESSMENT — ENCOUNTER SYMPTOMS
ABDOMINAL PAIN: 0
NAUSEA: 0
COUGH: 1
SHORTNESS OF BREATH: 1

## 2023-01-10 NOTE — PROGRESS NOTES
Subjective:      Patient ID: Serene Vu is a 45 y.o. male    COPD  He complains of cough and shortness of breath. Pertinent negatives include no chest pain, fever or headaches. : Discuss Medical Conditions    TELEHEALTH EVALUATION -- Audio/Visual (During WXPWJ-08 public health emergency)    Patient identification was verified at the start of the visit: Yes    Services were provided through a video synchronous discussion virtually to substitute for in-person clinic visit. Patient and provider were located at their individual homes. Patient has requested an audio/video evaluation for the following concern(s):    Chief Complaint   Patient presents with    COPD       Hx of seeing [facility]. Related to chronic intercostal pain s/p lobectomy in 2016. Was on Norco and Gabapentin. Had intercostal INJ with no benefits. Gabapentin was dosed up and he is noticing good benefit of his pain. Denies pain with coughing anymore. He is on Gabapentin 800 mg TID. Pain controlled. No impact    COPD is preventing him from working physically demanding job. SOB.  <1/2 ppd smoking. On Dulera BID and Spiriva. NEB tx PRN. Was to be set up with PULM in October 2022. Never followed through with it. Never completed PFT order at same time.      Patient Active Problem List   Diagnosis    Left inguinal hernia    Simple chronic bronchitis (HCC)    Chest wall pain following surgery    Empyema lung (HCC)    S/P lobectomy of lung    Histoplasmosis    Depression with anxiety    Intercostal neuralgia       No results found for: LABA1C  No results found for: EAG    No components found for: CHLPL  No results found for: TRIG  No results found for: HDL  No results found for: LDLCALC  No results found for: LABVLDL      Chemistry        Component Value Date/Time     11/16/2017 1504    K 4.5 11/16/2017 1504     11/16/2017 1504    CO2 31 11/16/2017 1504    BUN 14 11/16/2017 1504    CREATININE 1.1 11/16/2017 1504        Component Value Date/Time    CALCIUM 9.6 11/16/2017 1504    ALKPHOS 83 11/16/2017 1504    AST 21 11/16/2017 1504    ALT 14 11/16/2017 1504    BILITOT 0.5 11/16/2017 1504            No results found for: TSH, J2RAIJY, F1YQINR, THYROIDAB    Lab Results   Component Value Date    WBC 8.7 11/16/2017    HGB 14.5 11/16/2017    HCT 41.8 (L) 11/16/2017    MCV 84.9 11/16/2017     (H) 11/16/2017         Health Maintenance   Topic Date Due    COVID-19 Vaccine (1) Never done    Pneumococcal 0-64 years Vaccine (1 - PCV) Never done    HIV screen  Never done    Hepatitis C screen  Never done    DTaP/Tdap/Td vaccine (1 - Tdap) Never done    Lipids  Never done    Colorectal Cancer Screen  Never done    Shingles vaccine (1 of 2) Never done    Flu vaccine (1) Never done    Depression Monitoring  07/07/2023    Hepatitis A vaccine  Aged Out    Hib vaccine  Aged Out    Meningococcal (ACWY) vaccine  Aged Out       There is no immunization history for the selected administration types on file for this patient. Review of Systems   Constitutional:  Negative for chills and fever. HENT: Negative. Respiratory:  Positive for cough and shortness of breath. Cardiovascular:  Negative for chest pain. Gastrointestinal:  Negative for abdominal pain and nausea. Skin:  Negative for rash. Neurological:  Negative for dizziness, light-headedness and headaches. Psychiatric/Behavioral: Negative. Objective:   Physical Exam  Constitutional:       General: He is not in acute distress. Appearance: He is not ill-appearing. Pulmonary:      Effort: Pulmonary effort is normal. No respiratory distress. Neurological:      Mental Status: He is alert and oriented to person, place, and time. Psychiatric:         Mood and Affect: Mood normal.         Behavior: Behavior normal.       Assessment:       Diagnosis Orders   1. Simple chronic bronchitis (HCC)        2. Smoking        3. Intercostal neuralgia        4.  S/P lobectomy of lung Plan:     Patient not active in treatment for COPD  Do not recommend not working    - restrictions in place for only physical employment   - sit down jobs appropriate  Must follow up with PULM  Restriction will  if not active in treatment (4 months)  Smoking cessation  RTO PRN      Danyell Brown, was evaluated through a synchronous (real-time) audio-video encounter. The patient (or guardian if applicable) is aware that this is a billable service, which includes applicable co-pays. This Virtual Visit was conducted with patient's (and/or legal guardian's) consent. The visit was conducted pursuant to the emergency declaration under the [facility], [facility] authority and the [employer] General Act. Patient identification was verified, and a caregiver was present when appropriate. The patient was located at Home: [address] [place] [address]. Provider was located at [facility] (Appt Dept): [address] [facility] [address]  [address] [facility],  [address] [facility]. Total time spent for this encounter: Not billed by time    --LILIA Donato - CNP on 1/10/2023 at 12:50 PM    An electronic signature was used to authenticate this note.

## 2023-01-11 ENCOUNTER — TELEPHONE (OUTPATIENT)
Dept: FAMILY MEDICINE CLINIC | Age: 53
End: 2023-01-11

## 2023-01-11 NOTE — TELEPHONE ENCOUNTER
----- Message from Mariola Lewis sent at 1/11/2023  2:42 PM EST -----  Subject: Message to Provider    QUESTIONS  Information for Provider? Pt calling b/c he just received paperwork from   disability and wants to know when PCP would be able to fill them out. Pt   says he will drop papers off tomorrow (01/12/23). Pt would like someone to   please call him back regarding this matter  ---------------------------------------------------------------------------  --------------  Gerson Kohler INFO  [phone]; OK to leave message on voicemail  ---------------------------------------------------------------------------  --------------  SCRIPT ANSWERS  Relationship to Patient?  Self

## 2023-01-12 NOTE — TELEPHONE ENCOUNTER
Pt called office, says he is still going to try and get the paperwork to the office today before we close at 4pm. Otherwise he will try to get them here tomorrow. He is trying to find someone to drop them off for him as well. JENNY.

## 2023-01-19 ENCOUNTER — TELEPHONE (OUTPATIENT)
Dept: FAMILY MEDICINE CLINIC | Age: 53
End: 2023-01-19

## 2023-01-19 NOTE — TELEPHONE ENCOUNTER
Spoke with Teresa Gonzalez with Dr. Gorge Li office. A referral was placed 10/2022 is is still good till end of year 2023. They attempted to call pt x 3, letter mailed and our office was notified. The only thing PULM needs prior to his visit is a chest xray. Order faxed to [facility] at [phone]. Patient notified and voiced understanding, he will call [facility] PUL and ask for Teresa Gonzalez.

## 2023-01-19 NOTE — TELEPHONE ENCOUNTER
----- Message from Odalis Glez sent at 1/19/2023 10:18 AM EST -----  Subject: Message to Provider    QUESTIONS  Information for Provider? Pt calling in stating he tried to set up a   pulmonary test and they are stating they need a reason as why he needs   this done and would like it faxed to Dr. Gigi Morton at [facility]   Pulmonary Fax? [phone]  ---------------------------------------------------------------------------  --------------  [id number]  [phone]; OK to leave message on voicemail  ---------------------------------------------------------------------------  --------------  SCRIPT ANSWERS  Relationship to Patient?  Self

## 2023-02-22 DIAGNOSIS — F43.0 ACUTE STRESS REACTION: ICD-10-CM

## 2023-02-22 DIAGNOSIS — F33.1 MODERATE EPISODE OF RECURRENT MAJOR DEPRESSIVE DISORDER (HCC): ICD-10-CM

## 2023-02-22 DIAGNOSIS — F43.21 DYSFUNCTIONAL GRIEVING: ICD-10-CM

## 2023-02-22 RX ORDER — SERTRALINE HYDROCHLORIDE 100 MG/1
100 TABLET, FILM COATED ORAL DAILY
Qty: 90 TABLET | Refills: 3 | Status: SHIPPED | OUTPATIENT
Start: 2023-02-22

## 2023-02-22 RX ORDER — HYDROXYZINE HYDROCHLORIDE 25 MG/1
25 TABLET, FILM COATED ORAL EVERY 8 HOURS PRN
Qty: 30 TABLET | Refills: 1 | Status: SHIPPED | OUTPATIENT
Start: 2023-02-22 | End: 2023-03-04

## 2023-02-22 NOTE — TELEPHONE ENCOUNTER
Kevin called requesting a refill of the below medication which has been pended for you:     Requested Prescriptions     Pending Prescriptions Disp Refills    sertraline (ZOLOFT) 100 MG tablet 90 tablet 3     Sig: Take 1 tablet by mouth daily    hydrOXYzine HCl (ATARAX) 25 MG tablet 30 tablet 1     Sig: Take 1 tablet by mouth every 8 hours as needed for Anxiety       Last Appointment Date: 1/10/2023  Next Appointment Date: Visit date not found    Allergies   Allergen Reactions    Penicillins Hives    Tramadol Hives       Pt also requesting a MG increase on Gabapentin currently taking 800 mg QID.  If no call back pt will check with JAYMIE Pina at 4 pm today.

## 2023-05-19 DIAGNOSIS — G58.8 INTERCOSTAL NEURALGIA: ICD-10-CM

## 2023-05-19 DIAGNOSIS — F43.0 ACUTE STRESS REACTION: ICD-10-CM

## 2023-05-19 RX ORDER — GABAPENTIN 800 MG/1
800 TABLET ORAL 4 TIMES DAILY
Qty: 120 TABLET | Refills: 5 | Status: SHIPPED | OUTPATIENT
Start: 2023-05-19 | End: 2023-11-15

## 2023-05-19 RX ORDER — HYDROXYZINE HYDROCHLORIDE 25 MG/1
TABLET, FILM COATED ORAL
Qty: 60 TABLET | Refills: 1 | Status: SHIPPED | OUTPATIENT
Start: 2023-05-19 | End: 2023-07-19

## 2023-05-19 RX ORDER — GABAPENTIN 800 MG/1
TABLET ORAL
Qty: 120 TABLET | Refills: 5 | OUTPATIENT
Start: 2023-05-19 | End: 2023-11-15

## 2023-05-19 NOTE — TELEPHONE ENCOUNTER
The patient called and is requesting a refill on his gabapentin 800mg [facility] Order pended for your signature.         If no call back the patient will check with his pharmacy after 1pm.

## 2023-07-13 DIAGNOSIS — G47.01 INSOMNIA DUE TO MEDICAL CONDITION: ICD-10-CM

## 2023-07-13 RX ORDER — DOXEPIN HYDROCHLORIDE 25 MG/1
CAPSULE ORAL
Qty: 90 CAPSULE | Refills: 3 | Status: SHIPPED | OUTPATIENT
Start: 2023-07-13

## 2023-07-19 DIAGNOSIS — F43.0 ACUTE STRESS REACTION: ICD-10-CM

## 2023-07-19 RX ORDER — HYDROXYZINE HYDROCHLORIDE 25 MG/1
TABLET, FILM COATED ORAL
Qty: 60 TABLET | Refills: 1 | Status: SHIPPED | OUTPATIENT
Start: 2023-07-19

## 2023-09-14 DIAGNOSIS — F43.0 ACUTE STRESS REACTION: ICD-10-CM

## 2023-09-14 RX ORDER — HYDROXYZINE HYDROCHLORIDE 25 MG/1
TABLET, FILM COATED ORAL
Qty: 60 TABLET | Refills: 1 | Status: SHIPPED | OUTPATIENT
Start: 2023-09-14

## 2023-09-28 ENCOUNTER — COMMUNITY OUTREACH (OUTPATIENT)
Dept: FAMILY MEDICINE CLINIC | Age: 53
End: 2023-09-28

## 2023-10-12 DIAGNOSIS — F43.21 DYSFUNCTIONAL GRIEVING: ICD-10-CM

## 2023-10-12 DIAGNOSIS — F33.1 MODERATE EPISODE OF RECURRENT MAJOR DEPRESSIVE DISORDER (HCC): ICD-10-CM

## 2023-10-12 RX ORDER — BUPROPION HYDROCHLORIDE 150 MG/1
150 TABLET ORAL EVERY MORNING
Qty: 90 TABLET | Refills: 3 | Status: SHIPPED | OUTPATIENT
Start: 2023-10-12

## 2023-10-12 NOTE — TELEPHONE ENCOUNTER
Patient requesting refill of Wellbutrin to [facility] DEPARTMENT [facility]. Please refill if appropriate.   Will check with pharmacy after 1pm.

## 2023-10-19 ENCOUNTER — TELEMEDICINE (OUTPATIENT)
Dept: FAMILY MEDICINE CLINIC | Age: 53
End: 2023-10-19
Payer: MEDICAID

## 2023-10-19 DIAGNOSIS — M54.50 ACUTE BILATERAL LOW BACK PAIN WITHOUT SCIATICA: Primary | ICD-10-CM

## 2023-10-19 PROCEDURE — 99213 OFFICE O/P EST LOW 20 MIN: CPT | Performed by: NURSE PRACTITIONER

## 2023-10-19 RX ORDER — KETOROLAC TROMETHAMINE 10 MG/1
10 TABLET, FILM COATED ORAL EVERY 6 HOURS PRN
Qty: 20 TABLET | Refills: 0 | Status: SHIPPED | OUTPATIENT
Start: 2023-10-19 | End: 2024-10-18

## 2023-10-19 RX ORDER — TIZANIDINE 4 MG/1
4 TABLET ORAL 3 TIMES DAILY
Qty: 15 TABLET | Refills: 0 | Status: SHIPPED | OUTPATIENT
Start: 2023-10-19

## 2023-10-19 RX ORDER — PREDNISONE 50 MG/1
50 TABLET ORAL DAILY
Qty: 5 TABLET | Refills: 0 | Status: SHIPPED | OUTPATIENT
Start: 2023-10-19 | End: 2023-10-24

## 2023-10-19 ASSESSMENT — ENCOUNTER SYMPTOMS
NAUSEA: 0
SHORTNESS OF BREATH: 0
BACK PAIN: 1
ABDOMINAL PAIN: 0
COUGH: 0

## 2023-10-19 NOTE — PROGRESS NOTES
Subjective:      Patient ID: Annabel Simmons is a 52 y.o. male    HPI: Acute for back pain    TELEHEALTH EVALUATION -- Audio/Visual     Patient identification was verified at the start of the visit: Yes    Services were provided through a video synchronous discussion virtually to substitute for in-person clinic visit. Patient and provider were located at their individual homes. Patient has requested an audio/video evaluation for the following concern(s):    Chief Complaint   Patient presents with    Lower Back Pain       Onset of 1 week with low back pain. Isacc Juarez asleep on the couch. Back pain since. Legs will just give out x 2. Bending over will bring the pain. Constant pain in back. Denies shooting pain or numbness/tingling    Patient Active Problem List   Diagnosis    Left inguinal hernia    Simple chronic bronchitis (HCC)    Chest wall pain following surgery    Empyema lung (HCC)    S/P lobectomy of lung    Histoplasmosis    Depression with anxiety    Intercostal neuralgia       Review of Systems   Constitutional:  Negative for chills and fever. HENT: Negative. Respiratory:  Negative for cough and shortness of breath. Cardiovascular:  Negative for chest pain. Gastrointestinal:  Negative for abdominal pain and nausea. Musculoskeletal:  Positive for back pain and myalgias. Skin:  Negative for rash. Neurological:  Negative for dizziness, weakness, light-headedness, numbness and headaches. Psychiatric/Behavioral: Negative. Objective:   Physical Exam  Constitutional:       General: He is not in acute distress. Appearance: He is not ill-appearing. Pulmonary:      Effort: Pulmonary effort is normal. No respiratory distress. Neurological:      Mental Status: He is alert and oriented to person, place, and time. Psychiatric:         Mood and Affect: Mood normal.         Behavior: Behavior normal.         Assessment:       Diagnosis Orders   1.  Acute bilateral low back pain

## 2023-11-13 DIAGNOSIS — G58.8 INTERCOSTAL NEURALGIA: ICD-10-CM

## 2023-11-13 RX ORDER — GABAPENTIN 800 MG/1
800 TABLET ORAL 4 TIMES DAILY
Qty: 120 TABLET | Refills: 5 | OUTPATIENT
Start: 2023-11-13

## 2023-11-13 RX ORDER — GABAPENTIN 800 MG/1
800 TABLET ORAL 4 TIMES DAILY
Qty: 120 TABLET | Refills: 5 | Status: SHIPPED | OUTPATIENT
Start: 2023-11-13 | End: 2024-05-11

## 2023-11-13 NOTE — TELEPHONE ENCOUNTER
The patient called in requesting a refill on his gabapentin 800mg to be sent to [facility]. Order pended for your signature.       If no call back the patient will check with his pharmacy after 2:00pm.

## 2024-02-02 DIAGNOSIS — F33.1 MODERATE EPISODE OF RECURRENT MAJOR DEPRESSIVE DISORDER (HCC): ICD-10-CM

## 2024-02-02 DIAGNOSIS — F43.21 DYSFUNCTIONAL GRIEVING: ICD-10-CM

## 2024-02-02 RX ORDER — SERTRALINE HYDROCHLORIDE 100 MG/1
100 TABLET, FILM COATED ORAL DAILY
Qty: 90 TABLET | Refills: 3 | Status: SHIPPED | OUTPATIENT
Start: 2024-02-02

## 2024-06-05 DIAGNOSIS — G58.8 INTERCOSTAL NEURALGIA: ICD-10-CM

## 2024-06-06 RX ORDER — GABAPENTIN 800 MG/1
800 TABLET ORAL 4 TIMES DAILY
Qty: 120 TABLET | Refills: 5 | Status: SHIPPED | OUTPATIENT
Start: 2024-06-06 | End: 2024-12-03

## 2024-06-15 ENCOUNTER — HOSPITAL ENCOUNTER (OUTPATIENT)
Age: 54
Setting detail: OBSERVATION
Discharge: HOME | End: 2024-06-18
Attending: EMERGENCY MEDICINE
Payer: MEDICAID

## 2024-06-15 ENCOUNTER — APPOINTMENT (OUTPATIENT)
Dept: GENERAL RADIOLOGY | Age: 54
End: 2024-06-15
Payer: MEDICAID

## 2024-06-15 DIAGNOSIS — F32.A DEPRESSION WITH SUICIDAL IDEATION: Primary | ICD-10-CM

## 2024-06-15 DIAGNOSIS — R45.851 DEPRESSION WITH SUICIDAL IDEATION: Primary | ICD-10-CM

## 2024-06-15 DIAGNOSIS — R07.9 CHEST PAIN, UNSPECIFIED TYPE: ICD-10-CM

## 2024-06-15 LAB
ALBUMIN SERPL BCG-MCNC: 4.3 G/DL (ref 3.5–5.1)
ALP SERPL-CCNC: 90 U/L (ref 38–126)
ALT SERPL W/O P-5'-P-CCNC: 12 U/L (ref 11–66)
AMPHETAMINES UR QL SCN: NEGATIVE
ANION GAP SERPL CALC-SCNC: 10 MEQ/L (ref 8–16)
APAP SERPL-MCNC: < 5 UG/ML (ref 0–20)
AST SERPL-CCNC: 15 U/L (ref 5–40)
BARBITURATES UR QL SCN: NEGATIVE
BASOPHILS ABSOLUTE: 0.1 THOU/MM3 (ref 0–0.1)
BASOPHILS NFR BLD AUTO: 0.8 %
BENZODIAZ UR QL SCN: NEGATIVE
BILIRUB CONJ SERPL-MCNC: < 0.1 MG/DL (ref 0.1–13.8)
BILIRUB SERPL-MCNC: 0.5 MG/DL (ref 0.3–1.2)
BILIRUB UR QL STRIP.AUTO: NEGATIVE
BUN SERPL-MCNC: 8 MG/DL (ref 7–22)
BZE UR QL SCN: NEGATIVE
CALCIUM SERPL-MCNC: 9.4 MG/DL (ref 8.5–10.5)
CANNABINOIDS UR QL SCN: POSITIVE
CHARACTER UR: CLEAR
CHLORIDE SERPL-SCNC: 103 MEQ/L (ref 98–111)
CO2 SERPL-SCNC: 27 MEQ/L (ref 23–33)
COLOR: YELLOW
CREAT SERPL-MCNC: 0.8 MG/DL (ref 0.4–1.2)
DEPRECATED RDW RBC AUTO: 42.5 FL (ref 35–45)
EOSINOPHIL NFR BLD AUTO: 1.9 %
EOSINOPHILS ABSOLUTE: 0.2 THOU/MM3 (ref 0–0.4)
ERYTHROCYTE [DISTWIDTH] IN BLOOD BY AUTOMATED COUNT: 13.7 % (ref 11.5–14.5)
ETHANOL SERPL-MCNC: < 0.01 % (ref 0–0.08)
FENTANYL: NEGATIVE
FLUAV RNA RESP QL NAA+PROBE: NOT DETECTED
FLUBV RNA RESP QL NAA+PROBE: NOT DETECTED
GFR SERPL CREATININE-BSD FRML MDRD: > 90 ML/MIN/1.73M2
GLUCOSE SERPL-MCNC: 80 MG/DL (ref 70–108)
GLUCOSE UR QL STRIP.AUTO: NEGATIVE MG/DL
HCT VFR BLD AUTO: 46.5 % (ref 42–52)
HGB BLD-MCNC: 15.3 GM/DL (ref 14–18)
HGB UR QL STRIP.AUTO: NEGATIVE
IMM GRANULOCYTES # BLD AUTO: 0.02 THOU/MM3 (ref 0–0.07)
IMM GRANULOCYTES NFR BLD AUTO: 0.2 %
KETONES UR QL STRIP.AUTO: NEGATIVE
LYMPHOCYTES ABSOLUTE: 2.1 THOU/MM3 (ref 1–4.8)
LYMPHOCYTES NFR BLD AUTO: 24 %
MAGNESIUM SERPL-MCNC: 1.8 MG/DL (ref 1.6–2.4)
MCH RBC QN AUTO: 28.2 PG (ref 26–33)
MCHC RBC AUTO-ENTMCNC: 32.9 GM/DL (ref 32.2–35.5)
MCV RBC AUTO: 85.6 FL (ref 80–94)
MONOCYTES ABSOLUTE: 0.4 THOU/MM3 (ref 0.4–1.3)
MONOCYTES NFR BLD AUTO: 4.1 %
NEUTROPHILS ABSOLUTE: 5.9 THOU/MM3 (ref 1.8–7.7)
NEUTROPHILS NFR BLD AUTO: 69 %
NITRITE UR QL STRIP: NEGATIVE
NRBC BLD AUTO-RTO: 0 /100 WBC
OPIATES UR QL SCN: NEGATIVE
OSMOLALITY SERPL CALC.SUM OF ELEC: 276.7 MOSMOL/KG (ref 275–300)
OXYCODONE: NEGATIVE
PCP UR QL SCN: NEGATIVE
PH UR STRIP.AUTO: 7 [PH] (ref 5–9)
PLATELET # BLD AUTO: 383 THOU/MM3 (ref 130–400)
PMV BLD AUTO: 9.3 FL (ref 9.4–12.4)
POTASSIUM SERPL-SCNC: 4 MEQ/L (ref 3.5–5.2)
PROCALCITONIN SERPL IA-MCNC: 0.04 NG/ML (ref 0.01–0.09)
PROT SERPL-MCNC: 7.9 G/DL (ref 6.1–8)
PROT UR STRIP.AUTO-MCNC: NEGATIVE MG/DL
RBC # BLD AUTO: 5.43 MILL/MM3 (ref 4.7–6.1)
SALICYLATES SERPL-MCNC: < 0.3 MG/DL (ref 2–10)
SARS-COV-2 RNA RESP QL NAA+PROBE: NOT DETECTED
SODIUM SERPL-SCNC: 140 MEQ/L (ref 135–145)
SP GR UR REFRACT.AUTO: < 1.005 (ref 1–1.03)
TROPONIN, HIGH SENSITIVITY: 13 NG/L (ref 0–12)
TROPONIN, HIGH SENSITIVITY: 15 NG/L (ref 0–12)
TSH SERPL DL<=0.005 MIU/L-ACNC: 1.59 UIU/ML (ref 0.4–4.2)
UROBILINOGEN, URINE: 0.2 EU/DL (ref 0–1)
WBC # BLD AUTO: 8.6 THOU/MM3 (ref 4.8–10.8)
WBC #/AREA URNS HPF: NEGATIVE /[HPF]

## 2024-06-15 PROCEDURE — 6370000000 HC RX 637 (ALT 250 FOR IP): Performed by: STUDENT IN AN ORGANIZED HEALTH CARE EDUCATION/TRAINING PROGRAM

## 2024-06-15 PROCEDURE — 82248 BILIRUBIN DIRECT: CPT

## 2024-06-15 PROCEDURE — 84484 ASSAY OF TROPONIN QUANT: CPT

## 2024-06-15 PROCEDURE — 71046 X-RAY EXAM CHEST 2 VIEWS: CPT

## 2024-06-15 PROCEDURE — 36415 COLL VENOUS BLD VENIPUNCTURE: CPT

## 2024-06-15 PROCEDURE — 80307 DRUG TEST PRSMV CHEM ANLYZR: CPT

## 2024-06-15 PROCEDURE — 84443 ASSAY THYROID STIM HORMONE: CPT

## 2024-06-15 PROCEDURE — 99223 1ST HOSP IP/OBS HIGH 75: CPT | Performed by: PHYSICIAN ASSISTANT

## 2024-06-15 PROCEDURE — 85025 COMPLETE CBC W/AUTO DIFF WBC: CPT

## 2024-06-15 PROCEDURE — 6370000000 HC RX 637 (ALT 250 FOR IP): Performed by: PHYSICIAN ASSISTANT

## 2024-06-15 PROCEDURE — G0378 HOSPITAL OBSERVATION PER HR: HCPCS

## 2024-06-15 PROCEDURE — 81003 URINALYSIS AUTO W/O SCOPE: CPT

## 2024-06-15 PROCEDURE — 82077 ASSAY SPEC XCP UR&BREATH IA: CPT

## 2024-06-15 PROCEDURE — 84145 PROCALCITONIN (PCT): CPT

## 2024-06-15 PROCEDURE — 94640 AIRWAY INHALATION TREATMENT: CPT

## 2024-06-15 PROCEDURE — 83735 ASSAY OF MAGNESIUM: CPT

## 2024-06-15 PROCEDURE — 93005 ELECTROCARDIOGRAM TRACING: CPT | Performed by: STUDENT IN AN ORGANIZED HEALTH CARE EDUCATION/TRAINING PROGRAM

## 2024-06-15 PROCEDURE — 99285 EMERGENCY DEPT VISIT HI MDM: CPT

## 2024-06-15 PROCEDURE — 80053 COMPREHEN METABOLIC PANEL: CPT

## 2024-06-15 PROCEDURE — 80143 DRUG ASSAY ACETAMINOPHEN: CPT

## 2024-06-15 PROCEDURE — 87636 SARSCOV2 & INF A&B AMP PRB: CPT

## 2024-06-15 PROCEDURE — 80179 DRUG ASSAY SALICYLATE: CPT

## 2024-06-15 RX ORDER — IBUPROFEN 200 MG
400 TABLET ORAL
Status: DISCONTINUED | OUTPATIENT
Start: 2024-06-16 | End: 2024-06-18 | Stop reason: HOSPADM

## 2024-06-15 RX ORDER — LISINOPRIL 10 MG/1
10 TABLET ORAL DAILY
Status: DISCONTINUED | OUTPATIENT
Start: 2024-06-15 | End: 2024-06-18 | Stop reason: HOSPADM

## 2024-06-15 RX ORDER — AZITHROMYCIN 250 MG/1
250 TABLET, FILM COATED ORAL DAILY
Status: DISCONTINUED | OUTPATIENT
Start: 2024-06-16 | End: 2024-06-18 | Stop reason: HOSPADM

## 2024-06-15 RX ORDER — NICOTINE 21 MG/24HR
1 PATCH, TRANSDERMAL 24 HOURS TRANSDERMAL DAILY
Status: DISCONTINUED | OUTPATIENT
Start: 2024-06-15 | End: 2024-06-18 | Stop reason: HOSPADM

## 2024-06-15 RX ORDER — MONTELUKAST SODIUM 10 MG/1
10 TABLET ORAL NIGHTLY
Status: DISCONTINUED | OUTPATIENT
Start: 2024-06-15 | End: 2024-06-18 | Stop reason: HOSPADM

## 2024-06-15 RX ORDER — LORAZEPAM 1 MG/1
1 TABLET ORAL ONCE
Status: COMPLETED | OUTPATIENT
Start: 2024-06-15 | End: 2024-06-15

## 2024-06-15 RX ORDER — AZITHROMYCIN 250 MG/1
500 TABLET, FILM COATED ORAL ONCE
Status: COMPLETED | OUTPATIENT
Start: 2024-06-15 | End: 2024-06-15

## 2024-06-15 RX ADMIN — LORAZEPAM 1 MG: 1 TABLET ORAL at 15:41

## 2024-06-15 RX ADMIN — LISINOPRIL 10 MG: 10 TABLET ORAL at 23:19

## 2024-06-15 RX ADMIN — MONTELUKAST SODIUM 10 MG: 10 TABLET ORAL at 23:19

## 2024-06-15 RX ADMIN — MOMETASONE FUROATE AND FORMOTEROL FUMARATE DIHYDRATE 2 PUFF: 200; 5 AEROSOL RESPIRATORY (INHALATION) at 16:37

## 2024-06-15 RX ADMIN — AZITHROMYCIN DIHYDRATE 500 MG: 250 TABLET ORAL at 21:31

## 2024-06-15 ASSESSMENT — PATIENT HEALTH QUESTIONNAIRE - PHQ9
2. FEELING DOWN, DEPRESSED OR HOPELESS: MORE THAN HALF THE DAYS
SUM OF ALL RESPONSES TO PHQ QUESTIONS 1-9: 15
5. POOR APPETITE OR OVEREATING: NOT AT ALL
9. THOUGHTS THAT YOU WOULD BE BETTER OFF DEAD, OR OF HURTING YOURSELF: NEARLY EVERY DAY
SUM OF ALL RESPONSES TO PHQ QUESTIONS 1-9: 15
SUM OF ALL RESPONSES TO PHQ9 QUESTIONS 1 & 2: 4
3. TROUBLE FALLING OR STAYING ASLEEP: MORE THAN HALF THE DAYS
SUM OF ALL RESPONSES TO PHQ QUESTIONS 1-9: 12
SUM OF ALL RESPONSES TO PHQ QUESTIONS 1-9: 15
4. FEELING TIRED OR HAVING LITTLE ENERGY: NEARLY EVERY DAY
1. LITTLE INTEREST OR PLEASURE IN DOING THINGS: MORE THAN HALF THE DAYS
10. IF YOU CHECKED OFF ANY PROBLEMS, HOW DIFFICULT HAVE THESE PROBLEMS MADE IT FOR YOU TO DO YOUR WORK, TAKE CARE OF THINGS AT HOME, OR GET ALONG WITH OTHER PEOPLE: EXTREMELY DIFFICULT
7. TROUBLE CONCENTRATING ON THINGS, SUCH AS READING THE NEWSPAPER OR WATCHING TELEVISION: NOT AT ALL
8. MOVING OR SPEAKING SO SLOWLY THAT OTHER PEOPLE COULD HAVE NOTICED. OR THE OPPOSITE, BEING SO FIGETY OR RESTLESS THAT YOU HAVE BEEN MOVING AROUND A LOT MORE THAN USUAL: NOT AT ALL
6. FEELING BAD ABOUT YOURSELF - OR THAT YOU ARE A FAILURE OR HAVE LET YOURSELF OR YOUR FAMILY DOWN: NEARLY EVERY DAY

## 2024-06-15 ASSESSMENT — SLEEP AND FATIGUE QUESTIONNAIRES
DO YOU USE A SLEEP AID: YES
DO YOU HAVE DIFFICULTY SLEEPING: YES
AVERAGE NUMBER OF SLEEP HOURS: 6
SLEEP PATTERN: RESTLESSNESS

## 2024-06-15 ASSESSMENT — HEART SCORE: ECG: NORMAL

## 2024-06-15 ASSESSMENT — PAIN DESCRIPTION - ORIENTATION: ORIENTATION: LEFT

## 2024-06-15 ASSESSMENT — PAIN DESCRIPTION - LOCATION
LOCATION: ABDOMEN
LOCATION: CHEST

## 2024-06-15 ASSESSMENT — PAIN DESCRIPTION - DESCRIPTORS: DESCRIPTORS: SHARP

## 2024-06-15 ASSESSMENT — LIFESTYLE VARIABLES
HOW MANY STANDARD DRINKS CONTAINING ALCOHOL DO YOU HAVE ON A TYPICAL DAY: PATIENT DOES NOT DRINK
HOW OFTEN DO YOU HAVE A DRINK CONTAINING ALCOHOL: NEVER
HOW OFTEN DO YOU HAVE A DRINK CONTAINING ALCOHOL: NEVER
HOW MANY STANDARD DRINKS CONTAINING ALCOHOL DO YOU HAVE ON A TYPICAL DAY: PATIENT DOES NOT DRINK

## 2024-06-15 ASSESSMENT — PAIN DESCRIPTION - FREQUENCY: FREQUENCY: CONTINUOUS

## 2024-06-15 ASSESSMENT — PAIN - FUNCTIONAL ASSESSMENT
PAIN_FUNCTIONAL_ASSESSMENT: ACTIVITIES ARE NOT PREVENTED
PAIN_FUNCTIONAL_ASSESSMENT: 0-10
PAIN_FUNCTIONAL_ASSESSMENT: 0-10

## 2024-06-15 ASSESSMENT — PAIN DESCRIPTION - PAIN TYPE: TYPE: ACUTE PAIN

## 2024-06-15 ASSESSMENT — PAIN SCALES - GENERAL
PAINLEVEL_OUTOF10: 10
PAINLEVEL_OUTOF10: 5
PAINLEVEL_OUTOF10: 10

## 2024-06-15 NOTE — ED NOTES
Pt A&O x4. Breathing easy and unlabored on RA. Pt updated on POC and admission process. Upon leaving the room noticed patient speaking to no one in room.

## 2024-06-15 NOTE — ED PROVIDER NOTES
[facility] EMERGENCY DEPARTMENT - VISIT NOTE    Patient Name: Kevin Livingston  MRN: [id number]  YOB: 1970  Date of Evaluation: 6/15/2024  Treating Resident Physician: Freddy Whelan MD  Supervising Physician: Zac Duarte MD    CHIEF COMPLAINT       Chief Complaint   Patient presents with    Suicidal       HISTORY OF PRESENT ILLNESS    HPI    History obtained from chart review and the patient.    Kevin is a 53 y.o. old male who presents to the emergency department by Ambulance for evaluation of suicidal ideation.  He also reports that he has chronic chest pain after a thoracotomy on the left side he has had this for years now.  Reports that he is suing his surgeon as he was told it was cancer and was not cancer on pathology.  He reports that he has chest pain daily and this has not changed.  He also reports that he has been coughing up green phlegm for the past month or 2 and thinks he might have an infection.  No increase in shortness of breath he does continue to smoke a pack of tobacco cigarettes daily as well as using marijuana.  Denies any other substance use currently but has used these significantly in the past.  Takes inhalers at home as well as nebulizers.  No fever, no chills.  They report suicidal nation he does not have a plan.  He reports that his depression has gotten worse as multiple family numbers have  over the past number of years and he feels isolated at home.  Reports that he is heard auditory hallucinations for many decades this is not new he usually can yell at them and tell them to there is not going to follow what they are telling him.  Over the last month or so he is now hearing his name being called in his house when he knows nobody else is there he thinks this may be his dead relatives calling to him.    Chart reviewed, relevant history summarized in HPI above.      REVIEW OF SYSTEMS   Review of Systems  Negative unless documented in HPI    PAST MEDICAL AND SURGICAL HISTORY 
following components:       Result Value    MPV 9.3 (*)     All other components within normal limits   SALICYLATE LEVEL - Abnormal; Notable for the following components:    Salicylate Lvl < 0.3 (*)     All other components within normal limits   TROPONIN - Abnormal; Notable for the following components:    Troponin, High Sensitivity 15 (*)     All other components within normal limits   TROPONIN - Abnormal; Notable for the following components:    Troponin, High Sensitivity 13 (*)     All other components within normal limits   COVID-19 & INFLUENZA COMBO   URINALYSIS WITH REFLEX TO CULTURE   URINE DRUG SCREEN   BASIC METABOLIC PANEL   ETHANOL   HEPATIC FUNCTION PANEL   TSH WITH REFLEX   PROCALCITONIN   ACETAMINOPHEN LEVEL   MAGNESIUM   ANION GAP   GLOMERULAR FILTRATION RATE, ESTIMATED   OSMOLALITY     XR CHEST (2 VW)   Final Result   1. Stable blunting of the left costophrenic angle likely secondary to chronic   pleural thickening.   2. Chronic lung disease.               **This report has been created using voice recognition software. It may contain   minor errors which are inherent in voice recognition technology.**         Electronically signed by Dr. Corey Rosario        Medications   nicotine (NICODERM CQ) 21 MG/24HR 1 patch (1 patch TransDERmal Patch Applied 6/15/24 1434)   mometasone-formoterol (DULERA) 200-5 MCG/ACT inhaler 2 puff (2 puffs Inhalation Given 6/15/24 1637)   LORazepam (ATIVAN) tablet 1 mg (1 mg Oral Given 6/15/24 1541)     FINAL IMPRESSION AND DISPOSITION      1. Depression with suicidal ideation    2. Chest pain, unspecified type        DISPOSITION Admitted 06/15/2024 07:31:12 PM      PATIENT REFERRED TO:  No follow-up provider specified.  DISCHARGE MEDICATIONS:  Current Discharge Medication List          (Please note that portions of this note were completed with a voice recognition program.  Efforts were made to edit the dictations but occasionally words aremis-transcribed.)    Zac

## 2024-06-15 NOTE — H&P
Range    Sodium 140 135 - 145 meq/L    Potassium 4.0 3.5 - 5.2 meq/L    Chloride 103 98 - 111 meq/L    CO2 27 23 - 33 meq/L    Glucose 80 70 - 108 mg/dL    BUN 8 7 - 22 mg/dL    Creatinine 0.8 0.4 - 1.2 mg/dL    Calcium 9.4 8.5 - 10.5 mg/dL   Ethanol    Collection Time: 06/15/24  2:22 PM   Result Value Ref Range    Ethanol Lvl < 0.01 0.00 %   Salicylate    Collection Time: 06/15/24  2:22 PM   Result Value Ref Range    Salicylate Lvl < 0.3 (L) 2.0 - 10.0 mg/dL   Hepatic Function Panel    Collection Time: 06/15/24  2:22 PM   Result Value Ref Range    Albumin 4.3 3.5 - 5.1 g/dL    Total Bilirubin 0.5 0.3 - 1.2 mg/dL    Bilirubin, Direct <0.1 0.1 - 13.8 mg/dL    Alkaline Phosphatase 90 38 - 126 U/L    AST 15 5 - 40 U/L    ALT 12 11 - 66 U/L    Total Protein 7.9 6.1 - 8.0 g/dL   Troponin    Collection Time: 06/15/24  2:22 PM   Result Value Ref Range    Troponin, High Sensitivity 15 (H) 0 - 12 ng/L   TSH with Reflex    Collection Time: 06/15/24  2:22 PM   Result Value Ref Range    TSH 1.590 0.400 - 4.200 uIU/mL   Procalcitonin    Collection Time: 06/15/24  2:22 PM   Result Value Ref Range    Procalcitonin 0.04 0.01 - 0.09 ng/mL   Acetaminophen Level    Collection Time: 06/15/24  2:22 PM   Result Value Ref Range    Acetaminophen Level < 5.0 0.0 - 20.0 ug/mL   Magnesium    Collection Time: 06/15/24  2:22 PM   Result Value Ref Range    Magnesium 1.8 1.6 - 2.4 mg/dL   Anion Gap    Collection Time: 06/15/24  2:22 PM   Result Value Ref Range    Anion Gap 10.0 8.0 - 16.0 meq/L   Glomerular Filtration Rate, Estimated    Collection Time: 06/15/24  2:22 PM   Result Value Ref Range    Est, Glom Filt Rate > 90 >60 ml/min/1.73m2   Osmolality    Collection Time: 06/15/24  2:22 PM   Result Value Ref Range    Osmolality Calc 276.7 275.0 - 300.0 mOsmol/kg   EKG 12 Lead    Collection Time: 06/15/24  2:25 PM   Result Value Ref Range    Ventricular Rate 52 BPM    Atrial Rate 52 BPM    P-R Interval 180 ms    QRS Duration 102 ms    Q-T

## 2024-06-15 NOTE — ED NOTES
Pt presents with c/o suicidal ideations. Pt states he thinks about suicide on a daily basis but does not have desire to act upon thoughts. Pt also has concern for hearing a voice on a regular basis.

## 2024-06-16 PROBLEM — F25.1 SCHIZOAFFECTIVE DISORDER, DEPRESSIVE TYPE (HCC): Status: ACTIVE | Noted: 2024-06-16

## 2024-06-16 LAB
EKG ATRIAL RATE: 52 BPM
EKG P AXIS: 74 DEGREES
EKG P-R INTERVAL: 180 MS
EKG Q-T INTERVAL: 428 MS
EKG QRS DURATION: 102 MS
EKG QTC CALCULATION (BAZETT): 398 MS
EKG R AXIS: -26 DEGREES
EKG T AXIS: 50 DEGREES
EKG VENTRICULAR RATE: 52 BPM

## 2024-06-16 PROCEDURE — G0378 HOSPITAL OBSERVATION PER HR: HCPCS

## 2024-06-16 PROCEDURE — 94640 AIRWAY INHALATION TREATMENT: CPT

## 2024-06-16 PROCEDURE — 94761 N-INVAS EAR/PLS OXIMETRY MLT: CPT

## 2024-06-16 PROCEDURE — 93010 ELECTROCARDIOGRAM REPORT: CPT | Performed by: INTERNAL MEDICINE

## 2024-06-16 PROCEDURE — 6370000000 HC RX 637 (ALT 250 FOR IP): Performed by: PHYSICIAN ASSISTANT

## 2024-06-16 PROCEDURE — 99232 SBSQ HOSP IP/OBS MODERATE 35: CPT | Performed by: NURSE PRACTITIONER

## 2024-06-16 PROCEDURE — 6370000000 HC RX 637 (ALT 250 FOR IP): Performed by: NURSE PRACTITIONER

## 2024-06-16 PROCEDURE — 99214 OFFICE O/P EST MOD 30 MIN: CPT | Performed by: PSYCHIATRY & NEUROLOGY

## 2024-06-16 RX ORDER — ONDANSETRON 4 MG/1
4 TABLET, ORALLY DISINTEGRATING ORAL EVERY 8 HOURS PRN
Status: DISCONTINUED | OUTPATIENT
Start: 2024-06-16 | End: 2024-06-18 | Stop reason: HOSPADM

## 2024-06-16 RX ORDER — ALBUTEROL SULFATE 90 UG/1
2 AEROSOL, METERED RESPIRATORY (INHALATION) EVERY 6 HOURS PRN
Status: DISCONTINUED | OUTPATIENT
Start: 2024-06-16 | End: 2024-06-18 | Stop reason: HOSPADM

## 2024-06-16 RX ORDER — SODIUM CHLORIDE 0.9 % (FLUSH) 0.9 %
5-40 SYRINGE (ML) INJECTION EVERY 12 HOURS SCHEDULED
Status: DISCONTINUED | OUTPATIENT
Start: 2024-06-16 | End: 2024-06-18 | Stop reason: HOSPADM

## 2024-06-16 RX ORDER — ALBUTEROL SULFATE 2.5 MG/3ML
2.5 SOLUTION RESPIRATORY (INHALATION) EVERY 4 HOURS PRN
Status: DISCONTINUED | OUTPATIENT
Start: 2024-06-16 | End: 2024-06-18 | Stop reason: HOSPADM

## 2024-06-16 RX ORDER — BUPROPION HYDROCHLORIDE 150 MG/1
150 TABLET ORAL EVERY MORNING
Status: DISCONTINUED | OUTPATIENT
Start: 2024-06-16 | End: 2024-06-18 | Stop reason: HOSPADM

## 2024-06-16 RX ORDER — SODIUM CHLORIDE 0.9 % (FLUSH) 0.9 %
5-40 SYRINGE (ML) INJECTION PRN
Status: DISCONTINUED | OUTPATIENT
Start: 2024-06-16 | End: 2024-06-18 | Stop reason: HOSPADM

## 2024-06-16 RX ORDER — SERTRALINE HYDROCHLORIDE 100 MG/1
100 TABLET, FILM COATED ORAL DAILY
Status: DISCONTINUED | OUTPATIENT
Start: 2024-06-16 | End: 2024-06-18 | Stop reason: HOSPADM

## 2024-06-16 RX ORDER — POTASSIUM CHLORIDE 20 MEQ/1
40 TABLET, EXTENDED RELEASE ORAL PRN
Status: DISCONTINUED | OUTPATIENT
Start: 2024-06-16 | End: 2024-06-18 | Stop reason: HOSPADM

## 2024-06-16 RX ORDER — SODIUM CHLORIDE 9 MG/ML
INJECTION, SOLUTION INTRAVENOUS PRN
Status: DISCONTINUED | OUTPATIENT
Start: 2024-06-16 | End: 2024-06-18 | Stop reason: HOSPADM

## 2024-06-16 RX ORDER — TIZANIDINE 4 MG/1
4 TABLET ORAL 3 TIMES DAILY
Status: DISCONTINUED | OUTPATIENT
Start: 2024-06-16 | End: 2024-06-16

## 2024-06-16 RX ORDER — POTASSIUM CHLORIDE 7.45 MG/ML
10 INJECTION INTRAVENOUS PRN
Status: DISCONTINUED | OUTPATIENT
Start: 2024-06-16 | End: 2024-06-18 | Stop reason: HOSPADM

## 2024-06-16 RX ORDER — ACETAMINOPHEN 325 MG/1
650 TABLET ORAL EVERY 6 HOURS PRN
Status: DISCONTINUED | OUTPATIENT
Start: 2024-06-16 | End: 2024-06-18 | Stop reason: HOSPADM

## 2024-06-16 RX ORDER — ALBUTEROL SULFATE 2.5 MG/3ML
2.5 SOLUTION RESPIRATORY (INHALATION) EVERY 4 HOURS
Status: DISCONTINUED | OUTPATIENT
Start: 2024-06-16 | End: 2024-06-16

## 2024-06-16 RX ORDER — ENOXAPARIN SODIUM 100 MG/ML
40 INJECTION SUBCUTANEOUS DAILY
Status: DISCONTINUED | OUTPATIENT
Start: 2024-06-16 | End: 2024-06-18 | Stop reason: HOSPADM

## 2024-06-16 RX ORDER — SODIUM CHLORIDE 9 MG/ML
INJECTION, SOLUTION INTRAVENOUS CONTINUOUS
Status: DISCONTINUED | OUTPATIENT
Start: 2024-06-16 | End: 2024-06-16

## 2024-06-16 RX ORDER — DOXEPIN HYDROCHLORIDE 25 MG/1
25 CAPSULE ORAL NIGHTLY
Status: DISCONTINUED | OUTPATIENT
Start: 2024-06-16 | End: 2024-06-18 | Stop reason: HOSPADM

## 2024-06-16 RX ORDER — ONDANSETRON 2 MG/ML
4 INJECTION INTRAMUSCULAR; INTRAVENOUS EVERY 6 HOURS PRN
Status: DISCONTINUED | OUTPATIENT
Start: 2024-06-16 | End: 2024-06-18 | Stop reason: HOSPADM

## 2024-06-16 RX ORDER — ALBUTEROL SULFATE 2.5 MG/3ML
2.5 SOLUTION RESPIRATORY (INHALATION) EVERY 4 HOURS
Status: DISCONTINUED | OUTPATIENT
Start: 2024-06-16 | End: 2024-06-16 | Stop reason: CLARIF

## 2024-06-16 RX ORDER — POLYETHYLENE GLYCOL 3350 17 G/17G
17 POWDER, FOR SOLUTION ORAL DAILY PRN
Status: DISCONTINUED | OUTPATIENT
Start: 2024-06-16 | End: 2024-06-18 | Stop reason: HOSPADM

## 2024-06-16 RX ORDER — HYDROXYZINE HYDROCHLORIDE 25 MG/1
25 TABLET, FILM COATED ORAL 3 TIMES DAILY PRN
Status: DISCONTINUED | OUTPATIENT
Start: 2024-06-16 | End: 2024-06-18 | Stop reason: HOSPADM

## 2024-06-16 RX ORDER — ACETAMINOPHEN 650 MG/1
650 SUPPOSITORY RECTAL EVERY 6 HOURS PRN
Status: DISCONTINUED | OUTPATIENT
Start: 2024-06-16 | End: 2024-06-18 | Stop reason: HOSPADM

## 2024-06-16 RX ORDER — GABAPENTIN 400 MG/1
800 CAPSULE ORAL 4 TIMES DAILY
Status: DISCONTINUED | OUTPATIENT
Start: 2024-06-16 | End: 2024-06-18 | Stop reason: HOSPADM

## 2024-06-16 RX ORDER — IPRATROPIUM BROMIDE AND ALBUTEROL SULFATE 2.5; .5 MG/3ML; MG/3ML
1 SOLUTION RESPIRATORY (INHALATION) EVERY 4 HOURS
Status: DISCONTINUED | OUTPATIENT
Start: 2024-06-16 | End: 2024-06-16 | Stop reason: CLARIF

## 2024-06-16 RX ORDER — MAGNESIUM SULFATE IN WATER 40 MG/ML
2000 INJECTION, SOLUTION INTRAVENOUS PRN
Status: DISCONTINUED | OUTPATIENT
Start: 2024-06-16 | End: 2024-06-18 | Stop reason: HOSPADM

## 2024-06-16 RX ADMIN — DICLOFENAC SODIUM 2 G: 10 GEL TOPICAL at 12:52

## 2024-06-16 RX ADMIN — TIOTROPIUM BROMIDE INHALATION SPRAY 2 PUFF: 3.12 SPRAY, METERED RESPIRATORY (INHALATION) at 08:59

## 2024-06-16 RX ADMIN — BUPROPION HYDROCHLORIDE 150 MG: 150 TABLET, EXTENDED RELEASE ORAL at 10:38

## 2024-06-16 RX ADMIN — IBUPROFEN 400 MG: 200 TABLET, FILM COATED ORAL at 12:55

## 2024-06-16 RX ADMIN — HYDROXYZINE HYDROCHLORIDE 25 MG: 25 TABLET, FILM COATED ORAL at 20:05

## 2024-06-16 RX ADMIN — GABAPENTIN 800 MG: 400 CAPSULE ORAL at 20:04

## 2024-06-16 RX ADMIN — MONTELUKAST SODIUM 10 MG: 10 TABLET ORAL at 20:04

## 2024-06-16 RX ADMIN — GABAPENTIN 800 MG: 400 CAPSULE ORAL at 12:53

## 2024-06-16 RX ADMIN — SERTRALINE 100 MG: 100 TABLET, FILM COATED ORAL at 10:37

## 2024-06-16 RX ADMIN — MOMETASONE FUROATE AND FORMOTEROL FUMARATE DIHYDRATE 2 PUFF: 200; 5 AEROSOL RESPIRATORY (INHALATION) at 18:04

## 2024-06-16 RX ADMIN — GABAPENTIN 800 MG: 400 CAPSULE ORAL at 17:28

## 2024-06-16 RX ADMIN — DOXEPIN HYDROCHLORIDE 25 MG: 25 CAPSULE ORAL at 20:04

## 2024-06-16 RX ADMIN — LISINOPRIL 10 MG: 10 TABLET ORAL at 08:44

## 2024-06-16 RX ADMIN — GABAPENTIN 800 MG: 400 CAPSULE ORAL at 10:37

## 2024-06-16 RX ADMIN — IBUPROFEN 400 MG: 200 TABLET, FILM COATED ORAL at 17:27

## 2024-06-16 RX ADMIN — MOMETASONE FUROATE AND FORMOTEROL FUMARATE DIHYDRATE 2 PUFF: 200; 5 AEROSOL RESPIRATORY (INHALATION) at 08:59

## 2024-06-16 RX ADMIN — AZITHROMYCIN DIHYDRATE 250 MG: 250 TABLET ORAL at 08:44

## 2024-06-16 RX ADMIN — IBUPROFEN 400 MG: 200 TABLET, FILM COATED ORAL at 08:47

## 2024-06-16 RX ADMIN — ACETAMINOPHEN 650 MG: 325 TABLET ORAL at 20:04

## 2024-06-16 ASSESSMENT — PAIN DESCRIPTION - LOCATION
LOCATION: CHEST

## 2024-06-16 ASSESSMENT — PAIN DESCRIPTION - ORIENTATION
ORIENTATION: LEFT

## 2024-06-16 ASSESSMENT — PAIN SCALES - GENERAL
PAINLEVEL_OUTOF10: 10
PAINLEVEL_OUTOF10: 7
PAINLEVEL_OUTOF10: 5
PAINLEVEL_OUTOF10: 8

## 2024-06-16 ASSESSMENT — PAIN - FUNCTIONAL ASSESSMENT
PAIN_FUNCTIONAL_ASSESSMENT: ACTIVITIES ARE NOT PREVENTED

## 2024-06-16 ASSESSMENT — PAIN DESCRIPTION - DESCRIPTORS
DESCRIPTORS: ACHING;DISCOMFORT
DESCRIPTORS: SHARP
DESCRIPTORS: ACHING;DISCOMFORT
DESCRIPTORS: ACHING

## 2024-06-16 ASSESSMENT — PAIN DESCRIPTION - FREQUENCY: FREQUENCY: CONTINUOUS

## 2024-06-16 ASSESSMENT — PAIN DESCRIPTION - PAIN TYPE: TYPE: ACUTE PAIN

## 2024-06-16 NOTE — CONSULTS
SubCUTAneous, Daily  ondansetron (ZOFRAN-ODT) disintegrating tablet 4 mg, 4 mg, Oral, Q8H PRN **OR** ondansetron (ZOFRAN) injection 4 mg, 4 mg, IntraVENous, Q6H PRN  polyethylene glycol (GLYCOLAX) packet 17 g, 17 g, Oral, Daily PRN  acetaminophen (TYLENOL) tablet 650 mg, 650 mg, Oral, Q6H PRN **OR** acetaminophen (TYLENOL) suppository 650 mg, 650 mg, Rectal, Q6H PRN  diclofenac sodium (VOLTAREN) 1 % gel 2 g, 2 g, Topical, 4x Daily PRN  albuterol (PROVENTIL) (2.5 MG/3ML) 0.083% nebulizer solution 2.5 mg, 2.5 mg, Nebulization, Q4H PRN  nicotine (NICODERM CQ) 21 MG/24HR 1 patch, 1 patch, TransDERmal, Daily  montelukast (SINGULAIR) tablet 10 mg, 10 mg, Oral, Nightly  [COMPLETED] azithromycin (ZITHROMAX) tablet 500 mg, 500 mg, Oral, Once **FOLLOWED BY** azithromycin (ZITHROMAX) tablet 250 mg, 250 mg, Oral, Daily  ibuprofen (ADVIL;MOTRIN) tablet 400 mg, 400 mg, Oral, TID WC  lisinopril (PRINIVIL;ZESTRIL) tablet 10 mg, 10 mg, Oral, Daily    Drug and Alcohol History:  Marijuana uSe    Most Recent Urine Drug Screen at [facility]:  No components found for: \"IAMMENTA\", \"IBARBIT\", \"IBENZO\", \"ICOCAINE\", \"IMARTHC\", \"IOPIATES\", \"IPHENCYC\"    PAST PSYCHIATRIC HISTORY:  Mood disorder    Past psychiatric medications include:  Wellbutrin, Lexapro    Adverse reactions from psychotropic medications:  unknown    Past Medical History:        Diagnosis Date    Anxiety     COPD (chronic obstructive pulmonary disease) (HCC)     Depression with anxiety     Histoplasmosis     Inguinal hernia      Past Surgical History:        Procedure Laterality Date    HYDROCELE EXCISION  2013    amie    LOBECTOMY Left 09/27/2016    Meadistinoscopy and biopsy with frozen section and robotic assisted left upper lobectomy by Dr Espinoza    OTHER SURGICAL HISTORY  03/21/2017    Interscostal Block T9, T10, T11     Allergies:  Penicillins and Tramadol      Family History:       Problem Relation Age of Onset    Diabetes Mother     High Blood Pressure Mother

## 2024-06-16 NOTE — RT PROTOCOL NOTE
or increased work of breathing using Per Protocol order mode.        4-6 - enter or revise RT Bronchodilator order(s) to two equivalent RT bronchodilator orders with one order with BID Frequency and one order with Frequency of every 4 hours PRN wheezing or increased work of breathing using Per Protocol order mode.        7-10 - enter or revise RT Bronchodilator order(s) to two equivalent RT bronchodilator orders with one order with TID Frequency and one order with Frequency of every 4 hours PRN wheezing or increased work of breathing using Per Protocol order mode.       11-13 - enter or revise RT Bronchodilator order(s) to one equivalent RT bronchodilator order with QID Frequency and an Albuterol order with Frequency of every 4 hours PRN wheezing or increased work of breathing using Per Protocol order mode.      Greater than 13 - enter or revise RT Bronchodilator order(s) to one equivalent RT bronchodilator order with every 4 hours Frequency and an Albuterol order with Frequency of every 2 hours PRN wheezing or increased work of breathing using Per Protocol order mode.     RT to enter RT Home Evaluation for COPD & MDI Assessment order using Per Protocol order mode.    Electronically signed by Melina Hull RCP on 6/16/2024 at 9:58 AM

## 2024-06-16 NOTE — PLAN OF CARE
Problem: Respiratory - Adult  Goal: Clear lung sounds  Outcome: Progressing  Note: Patient had absent breath sounds on the left side and clear on the right side. Continue taking breathing treatments and inhalers as ordered to improve aeration.

## 2024-06-17 LAB
ANION GAP SERPL CALC-SCNC: 5 MEQ/L (ref 8–16)
BASOPHILS ABSOLUTE: 0.1 THOU/MM3 (ref 0–0.1)
BASOPHILS NFR BLD AUTO: 0.7 %
BUN SERPL-MCNC: 14 MG/DL (ref 7–22)
CALCIUM SERPL-MCNC: 9.4 MG/DL (ref 8.5–10.5)
CHLORIDE SERPL-SCNC: 105 MEQ/L (ref 98–111)
CO2 SERPL-SCNC: 30 MEQ/L (ref 23–33)
CREAT SERPL-MCNC: 0.9 MG/DL (ref 0.4–1.2)
DEPRECATED RDW RBC AUTO: 41.3 FL (ref 35–45)
EOSINOPHIL NFR BLD AUTO: 1.6 %
EOSINOPHILS ABSOLUTE: 0.1 THOU/MM3 (ref 0–0.4)
ERYTHROCYTE [DISTWIDTH] IN BLOOD BY AUTOMATED COUNT: 13.5 % (ref 11.5–14.5)
GFR SERPL CREATININE-BSD FRML MDRD: > 90 ML/MIN/1.73M2
GLUCOSE SERPL-MCNC: 87 MG/DL (ref 70–108)
HCT VFR BLD AUTO: 44.3 % (ref 42–52)
HGB BLD-MCNC: 15 GM/DL (ref 14–18)
IMM GRANULOCYTES # BLD AUTO: 0.02 THOU/MM3 (ref 0–0.07)
IMM GRANULOCYTES NFR BLD AUTO: 0.2 %
LYMPHOCYTES ABSOLUTE: 2.3 THOU/MM3 (ref 1–4.8)
LYMPHOCYTES NFR BLD AUTO: 26.4 %
MCH RBC QN AUTO: 28.3 PG (ref 26–33)
MCHC RBC AUTO-ENTMCNC: 33.9 GM/DL (ref 32.2–35.5)
MCV RBC AUTO: 83.6 FL (ref 80–94)
MONOCYTES ABSOLUTE: 0.6 THOU/MM3 (ref 0.4–1.3)
MONOCYTES NFR BLD AUTO: 7.3 %
NEUTROPHILS ABSOLUTE: 5.6 THOU/MM3 (ref 1.8–7.7)
NEUTROPHILS NFR BLD AUTO: 63.8 %
NRBC BLD AUTO-RTO: 0 /100 WBC
PLATELET # BLD AUTO: 383 THOU/MM3 (ref 130–400)
PMV BLD AUTO: 8.8 FL (ref 9.4–12.4)
POTASSIUM SERPL-SCNC: 4.4 MEQ/L (ref 3.5–5.2)
RBC # BLD AUTO: 5.3 MILL/MM3 (ref 4.7–6.1)
SODIUM SERPL-SCNC: 140 MEQ/L (ref 135–145)
WBC # BLD AUTO: 8.7 THOU/MM3 (ref 4.8–10.8)

## 2024-06-17 PROCEDURE — 80048 BASIC METABOLIC PNL TOTAL CA: CPT

## 2024-06-17 PROCEDURE — G0378 HOSPITAL OBSERVATION PER HR: HCPCS

## 2024-06-17 PROCEDURE — 6370000000 HC RX 637 (ALT 250 FOR IP): Performed by: PHYSICIAN ASSISTANT

## 2024-06-17 PROCEDURE — 85025 COMPLETE CBC W/AUTO DIFF WBC: CPT

## 2024-06-17 PROCEDURE — 36415 COLL VENOUS BLD VENIPUNCTURE: CPT

## 2024-06-17 PROCEDURE — 99239 HOSP IP/OBS DSCHRG MGMT >30: CPT | Performed by: NURSE PRACTITIONER

## 2024-06-17 PROCEDURE — 94640 AIRWAY INHALATION TREATMENT: CPT

## 2024-06-17 RX ORDER — MONTELUKAST SODIUM 10 MG/1
10 TABLET ORAL NIGHTLY
OUTPATIENT
Start: 2024-06-17

## 2024-06-17 RX ORDER — ALBUTEROL SULFATE 90 UG/1
2 AEROSOL, METERED RESPIRATORY (INHALATION) EVERY 6 HOURS PRN
OUTPATIENT
Start: 2024-06-17

## 2024-06-17 RX ORDER — GABAPENTIN 400 MG/1
800 CAPSULE ORAL 4 TIMES DAILY
OUTPATIENT
Start: 2024-06-17

## 2024-06-17 RX ORDER — BUPROPION HYDROCHLORIDE 150 MG/1
150 TABLET ORAL EVERY MORNING
OUTPATIENT
Start: 2024-06-17

## 2024-06-17 RX ORDER — NICOTINE 21 MG/24HR
1 PATCH, TRANSDERMAL 24 HOURS TRANSDERMAL DAILY
OUTPATIENT
Start: 2024-06-17

## 2024-06-17 RX ORDER — ALBUTEROL SULFATE 2.5 MG/3ML
2.5 SOLUTION RESPIRATORY (INHALATION) EVERY 4 HOURS PRN
OUTPATIENT
Start: 2024-06-17

## 2024-06-17 RX ORDER — SERTRALINE HYDROCHLORIDE 100 MG/1
100 TABLET, FILM COATED ORAL DAILY
OUTPATIENT
Start: 2024-06-17

## 2024-06-17 RX ORDER — IBUPROFEN 200 MG
400 TABLET ORAL
OUTPATIENT
Start: 2024-06-17

## 2024-06-17 RX ORDER — ACETAMINOPHEN 650 MG/1
650 SUPPOSITORY RECTAL EVERY 6 HOURS PRN
OUTPATIENT
Start: 2024-06-17

## 2024-06-17 RX ORDER — LISINOPRIL 10 MG/1
10 TABLET ORAL DAILY
OUTPATIENT
Start: 2024-06-17

## 2024-06-17 RX ORDER — DOXEPIN HYDROCHLORIDE 25 MG/1
25 CAPSULE ORAL NIGHTLY
OUTPATIENT
Start: 2024-06-17

## 2024-06-17 RX ORDER — HYDROXYZINE HYDROCHLORIDE 25 MG/1
25 TABLET, FILM COATED ORAL 3 TIMES DAILY PRN
OUTPATIENT
Start: 2024-06-17

## 2024-06-17 RX ORDER — POLYETHYLENE GLYCOL 3350 17 G/17G
17 POWDER, FOR SOLUTION ORAL DAILY PRN
OUTPATIENT
Start: 2024-06-17

## 2024-06-17 RX ORDER — ACETAMINOPHEN 325 MG/1
650 TABLET ORAL EVERY 6 HOURS PRN
OUTPATIENT
Start: 2024-06-17

## 2024-06-17 RX ADMIN — IBUPROFEN 400 MG: 200 TABLET, FILM COATED ORAL at 12:37

## 2024-06-17 RX ADMIN — IBUPROFEN 400 MG: 200 TABLET, FILM COATED ORAL at 09:30

## 2024-06-17 RX ADMIN — IBUPROFEN 400 MG: 200 TABLET, FILM COATED ORAL at 17:19

## 2024-06-17 RX ADMIN — GABAPENTIN 800 MG: 400 CAPSULE ORAL at 12:37

## 2024-06-17 RX ADMIN — DOXEPIN HYDROCHLORIDE 25 MG: 25 CAPSULE ORAL at 20:08

## 2024-06-17 RX ADMIN — MOMETASONE FUROATE AND FORMOTEROL FUMARATE DIHYDRATE 2 PUFF: 200; 5 AEROSOL RESPIRATORY (INHALATION) at 06:32

## 2024-06-17 RX ADMIN — AZITHROMYCIN DIHYDRATE 250 MG: 250 TABLET ORAL at 09:29

## 2024-06-17 RX ADMIN — POLYETHYLENE GLYCOL 3350 17 G: 17 POWDER, FOR SOLUTION ORAL at 21:54

## 2024-06-17 RX ADMIN — SERTRALINE 100 MG: 100 TABLET, FILM COATED ORAL at 09:30

## 2024-06-17 RX ADMIN — MOMETASONE FUROATE AND FORMOTEROL FUMARATE DIHYDRATE 2 PUFF: 200; 5 AEROSOL RESPIRATORY (INHALATION) at 22:19

## 2024-06-17 RX ADMIN — TIOTROPIUM BROMIDE INHALATION SPRAY 2 PUFF: 3.12 SPRAY, METERED RESPIRATORY (INHALATION) at 06:32

## 2024-06-17 RX ADMIN — LISINOPRIL 10 MG: 10 TABLET ORAL at 09:30

## 2024-06-17 RX ADMIN — MONTELUKAST SODIUM 10 MG: 10 TABLET ORAL at 20:07

## 2024-06-17 RX ADMIN — HYDROXYZINE HYDROCHLORIDE 25 MG: 25 TABLET, FILM COATED ORAL at 22:09

## 2024-06-17 RX ADMIN — BUPROPION HYDROCHLORIDE 150 MG: 150 TABLET, EXTENDED RELEASE ORAL at 09:29

## 2024-06-17 RX ADMIN — GABAPENTIN 800 MG: 400 CAPSULE ORAL at 20:08

## 2024-06-17 RX ADMIN — GABAPENTIN 800 MG: 400 CAPSULE ORAL at 17:19

## 2024-06-17 RX ADMIN — GABAPENTIN 800 MG: 400 CAPSULE ORAL at 09:38

## 2024-06-17 NOTE — DISCHARGE SUMMARY
Hospital Medicine Discharge Summary      Patient Identification:   Kevin Livingston   : 1970  MRN: [id number]   Account: [id number]      Patient's PCP: Aj Cisse APRN - CNP    Admit Date: 6/15/2024     Discharge Date:   2024    Admitting Physician: No admitting provider for patient encounter.     Discharging Nurse Practitioner: LILIA Chaney CNP     Discharge Diagnoses with Assessment/Plan:  Suicide ideation--transfer to psychiatric unit  Chronic chest wall pain--has been present since #7; on Neurontin 800 mg 4 times daily at home; no delta change greater than 6 and his troponins; EKG is showing sinus bradycardia heart rate 52;  Voltaren gel was ordered however patient stated it did not help  Hypertensive urgency with primary hypertension, uncontrolled--not on any medications at home, was started on lisinopril 10 mg daily with improvement; possibly increased secondary to stressors leading him to come to the hospital; blood pressures better controlled  COPD--stable, on room air, on Dulera, Spiriva  History of histoplasmosis  Tobacco abuse--cessation counseling, on NicoDerm 21 mg topically daily  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    Hospital Course:   Kevin Livingston is a 53 y.o. male admitted to [facility] on 6/15/2024 for suicide ideation; Per H&P done Laura 15, 2024: \"Patient presents emergency department with ongoing suicidal ideation.  Patient reports he has had increased life stressors including his  brother's birthday which have led him to feel suicidal.  Patient denies having a plan for suicide.  Patient has a long history of depression and anxiety.  During the evaluation for hospitalization the patient complains of chronic left chest pain.

## 2024-06-18 VITALS
WEIGHT: 174.82 LBS | SYSTOLIC BLOOD PRESSURE: 168 MMHG | OXYGEN SATURATION: 99 % | DIASTOLIC BLOOD PRESSURE: 90 MMHG | TEMPERATURE: 97.7 F | HEIGHT: 76 IN | RESPIRATION RATE: 16 BRPM | BODY MASS INDEX: 21.29 KG/M2 | HEART RATE: 72 BPM

## 2024-06-18 PROBLEM — F33.3 MDD (MAJOR DEPRESSIVE DISORDER), RECURRENT, SEVERE, WITH PSYCHOSIS (HCC): Status: ACTIVE | Noted: 2024-06-18

## 2024-06-18 PROCEDURE — G0378 HOSPITAL OBSERVATION PER HR: HCPCS

## 2024-06-18 PROCEDURE — 6370000000 HC RX 637 (ALT 250 FOR IP): Performed by: PHYSICIAN ASSISTANT

## 2024-06-18 PROCEDURE — 94640 AIRWAY INHALATION TREATMENT: CPT

## 2024-06-18 PROCEDURE — 99232 SBSQ HOSP IP/OBS MODERATE 35: CPT | Performed by: PHYSICIAN ASSISTANT

## 2024-06-18 PROCEDURE — 99239 HOSP IP/OBS DSCHRG MGMT >30: CPT | Performed by: NURSE PRACTITIONER

## 2024-06-18 RX ORDER — HYDROXYZINE HYDROCHLORIDE 25 MG/1
25 TABLET, FILM COATED ORAL 3 TIMES DAILY PRN
Qty: 21 TABLET | Refills: 0 | Status: SHIPPED | OUTPATIENT
Start: 2024-06-18 | End: 2024-06-25

## 2024-06-18 RX ORDER — LISINOPRIL 10 MG/1
10 TABLET ORAL DAILY
Qty: 30 TABLET | Refills: 0 | Status: SHIPPED | OUTPATIENT
Start: 2024-06-19

## 2024-06-18 RX ADMIN — SERTRALINE 100 MG: 100 TABLET, FILM COATED ORAL at 09:29

## 2024-06-18 RX ADMIN — IBUPROFEN 400 MG: 200 TABLET, FILM COATED ORAL at 12:46

## 2024-06-18 RX ADMIN — GABAPENTIN 800 MG: 400 CAPSULE ORAL at 12:46

## 2024-06-18 RX ADMIN — TIOTROPIUM BROMIDE INHALATION SPRAY 2 PUFF: 3.12 SPRAY, METERED RESPIRATORY (INHALATION) at 08:47

## 2024-06-18 RX ADMIN — IBUPROFEN 400 MG: 200 TABLET, FILM COATED ORAL at 09:30

## 2024-06-18 RX ADMIN — MOMETASONE FUROATE AND FORMOTEROL FUMARATE DIHYDRATE 2 PUFF: 200; 5 AEROSOL RESPIRATORY (INHALATION) at 08:47

## 2024-06-18 RX ADMIN — GABAPENTIN 800 MG: 400 CAPSULE ORAL at 09:29

## 2024-06-18 RX ADMIN — BUPROPION HYDROCHLORIDE 150 MG: 150 TABLET, EXTENDED RELEASE ORAL at 09:30

## 2024-06-18 RX ADMIN — AZITHROMYCIN DIHYDRATE 250 MG: 250 TABLET ORAL at 09:30

## 2024-06-18 RX ADMIN — LISINOPRIL 10 MG: 10 TABLET ORAL at 09:30

## 2024-06-18 ASSESSMENT — PAIN DESCRIPTION - DESCRIPTORS
DESCRIPTORS: TEARING
DESCRIPTORS: TEARING

## 2024-06-18 ASSESSMENT — PAIN DESCRIPTION - LOCATION
LOCATION: CHEST
LOCATION: CHEST

## 2024-06-18 ASSESSMENT — PAIN DESCRIPTION - PAIN TYPE: TYPE: CHRONIC PAIN

## 2024-06-18 ASSESSMENT — PAIN - FUNCTIONAL ASSESSMENT
PAIN_FUNCTIONAL_ASSESSMENT: ACTIVITIES ARE NOT PREVENTED
PAIN_FUNCTIONAL_ASSESSMENT: ACTIVITIES ARE NOT PREVENTED

## 2024-06-18 ASSESSMENT — PAIN SCALES - GENERAL
PAINLEVEL_OUTOF10: 10

## 2024-06-18 ASSESSMENT — PAIN DESCRIPTION - FREQUENCY: FREQUENCY: CONTINUOUS

## 2024-06-18 ASSESSMENT — PAIN DESCRIPTION - ORIENTATION
ORIENTATION: LEFT
ORIENTATION: LOWER

## 2024-06-18 ASSESSMENT — PAIN DESCRIPTION - ONSET: ONSET: ON-GOING

## 2024-06-18 NOTE — CARE COORDINATION
6/18/24, 12:01 PM EDT    DISCHARGE PLANNING EVALUATION    Gave patient outpatient Mental Health Services information.  [facility], provided in information given.

## 2024-06-18 NOTE — PROGRESS NOTES
Hospitalist Progress Note    Patient:  Kevin Livingston      Unit/Bed:8B-23/023-A    YOB: 1970    MRN: [id number]       Acct: [id number]     PCP: Aj Cisse APRN - CNP    Date of Admission: 6/15/2024    Assessment/Plan:    Suicide ideation--sitter at bedside, suicide precautions, appreciate psychiatry input and planning stay on the psychiatric unit  Chronic chest wall pain--has been present since #7; on Neurontin 800 mg 4 times daily at home; no delta change greater than 6 and his troponins; EKG is showing sinus bradycardia heart rate 52;  Voltaren gel  Hypertensive urgency with primary hypertension, uncontrolled--not on any medications at home, was started on lisinopril 10 mg daily with improvement; possibly increased secondary to stressors leading him to come to the hospital; blood pressures better controlled  COPD--stable, on room air, on Dulera, Spiriva  History of histoplasmosis  Tobacco abuse--cessation counseling, on NicoDerm 21 mg topically daily  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  Patient reports he has had increased life stressors including his  brother's birthday which have led him to feel suicidal.  Patient denies having a plan for suicide.  Patient has a long history of depression and anxiety.  During the evaluation for hospitalization the patient complains of chronic left chest pain.  Patient reports in 2016 he had a thoracotomy because he was thought to have lung cancer but in turn had histoplasmosis.  Patient has had pain in the left chest since that procedure.  
              Hospitalist Progress Note    Patient:  Kevin Livingston      Unit/Bed:8B-23/023-A    YOB: 1970    MRN: [id number]       Acct: [id number]     PCP: Aj Cisse APRN - CNP    Date of Admission: 6/15/2024    Assessment/Plan:    Suicide ideation--sitter at bedside, suicide precautions, awaiting psychiatry input; change diet to safety tray  Chronic chest wall pain--has been present since #7; on Neurontin 800 mg 4 times daily at home; no delta change greater than 6 and his troponins; EKG is showing sinus bradycardia heart rate 52; add Voltaren gel  Hypertensive urgency with primary hypertension, uncontrolled--not on any medications at home, was started on lisinopril 10 mg daily with improvement; possibly increased secondary to stressors leading him to come to the hospital  COPD--stable, on room air, on Dulera, Spiriva  History of histoplasmosis  Tobacco abuse--cessation counseling, on NicoDerm 21 mg topically daily  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  Patient reports he has had increased life stressors including his  brother's birthday which have led him to feel suicidal.  Patient denies having a plan for suicide.  Patient has a long history of depression and anxiety.  During the evaluation for hospitalization the patient complains of chronic left chest pain.  Patient reports in  he had a thoracotomy because he was thought to have lung cancer but in turn had histoplasmosis.  Patient has had pain in the left chest since that procedure.  Patient denies any increased pain with exertion.  The 
Patient discharged at this time. All IV's removed. Discharge instructions, medication changes and follow up appointments explained at this time. All questions answered at this time. AVS given to patient and safety plan reviewed with this RN. All patient belongings returned. Chart broken down and placed in yellow bin.       
Patient has no IV access. Patient refusing IV access at this time. Stated \"I do not want an Iv right now.\"  
IntraVENous, PRN  potassium chloride (KLOR-CON M) extended release tablet 40 mEq, 40 mEq, Oral, PRN **OR** potassium bicarb-citric acid (EFFER-K) effervescent tablet 40 mEq, 40 mEq, Oral, PRN **OR** potassium chloride 10 mEq/100 mL IVPB (Peripheral Line), 10 mEq, IntraVENous, PRN  magnesium sulfate 2000 mg in 50 mL IVPB premix, 2,000 mg, IntraVENous, PRN  enoxaparin (LOVENOX) injection 40 mg, 40 mg, SubCUTAneous, Daily  ondansetron (ZOFRAN-ODT) disintegrating tablet 4 mg, 4 mg, Oral, Q8H PRN **OR** ondansetron (ZOFRAN) injection 4 mg, 4 mg, IntraVENous, Q6H PRN  polyethylene glycol (GLYCOLAX) packet 17 g, 17 g, Oral, Daily PRN  acetaminophen (TYLENOL) tablet 650 mg, 650 mg, Oral, Q6H PRN **OR** acetaminophen (TYLENOL) suppository 650 mg, 650 mg, Rectal, Q6H PRN  diclofenac sodium (VOLTAREN) 1 % gel 2 g, 2 g, Topical, 4x Daily PRN  albuterol (PROVENTIL) (2.5 MG/3ML) 0.083% nebulizer solution 2.5 mg, 2.5 mg, Nebulization, Q4H PRN  nicotine (NICODERM CQ) 21 MG/24HR 1 patch, 1 patch, TransDERmal, Daily  montelukast (SINGULAIR) tablet 10 mg, 10 mg, Oral, Nightly  [COMPLETED] azithromycin (ZITHROMAX) tablet 500 mg, 500 mg, Oral, Once **FOLLOWED BY** azithromycin (ZITHROMAX) tablet 250 mg, 250 mg, Oral, Daily  ibuprofen (ADVIL;MOTRIN) tablet 400 mg, 400 mg, Oral, TID WC  lisinopril (PRINIVIL;ZESTRIL) tablet 10 mg, 10 mg, Oral, Daily     Physical     height is 1.93 m (6' 4\") and weight is 79.3 kg (174 lb 13.2 oz). His oral temperature is 97.7 °F (36.5 °C). His blood pressure is 168/90 (abnormal) and his pulse is 72. His respiration is 16 and oxygen saturation is 99%.   Lab Results   Component Value Date    WBC 8.7 06/17/2024    HGB 15.0 06/17/2024    HCT 44.3 06/17/2024     06/17/2024    ALT 12 06/15/2024    AST 15 06/15/2024     06/17/2024    K 4.4 06/17/2024     06/17/2024    CREATININE 0.9 06/17/2024    BUN 14 06/17/2024    CO2 30 06/17/2024    TSH 1.590 06/15/2024    INR 1.35 (H) 02/27/2017    
of connection.   Patient's mother and brother have passed. Patient lives in his mother's old apartment. Patient's little sister is his only living family and biggest support, but they have become disconnected because patient no longer has a phone.   Patient is not working. Patient relates this to his declining health. Patient becomes tearful stating that [facility] removed his lung that they believed was cancerous and when the biopsy came back no cancer was detected.   Patient remorseful and accountable for past choices.   He is a recovering alcoholic and illicit drug user. Patient is 26 years sober and 6 years clean. Patient does use marijuana daily and has since age 8. He also smokes cigarettes.   Suicidal thoughts and plans noted.   Homicidal thoughts and or plans denied.   Hallucinations noted. Delusions denied.   AOD noted.       While in saferoom:   Patient well mannered and cooperative.   Patient states feeling claustrophobic. Nurse informed.   Patient responding to internal stimuli exhibited by talking to himself and having a conversation with himself while in the saferooms when staff are not present in the room.    Provider Recommendation Information  Level of Care Disposition:      Consulted with medical provider. Patient is medically stabilized.  Consulted with patients RN about abnormalities or medical concerns. No abnormalities or medical concerns noted.  Consulted with . Patient to be admitted.   Nurses review chart and requet repeat Troponin and Blood pressure.   Provider to provider call

## 2024-06-18 NOTE — DISCHARGE SUMMARY
Hospital Medicine Discharge Summary      Patient Identification:   Kevin Livingston   : 1970  MRN: [id number]   Account: [id number]      Patient's PCP: Aj Cisse APRN - CNP    Admit Date: 6/15/2024     Discharge Date:   2024    Admitting Physician: No admitting provider for patient encounter.     Discharging Nurse Practitioner: LILIA Chaney CNP     Discharge Diagnoses with Assessment/Plan:  Suicide ideation--appreciate psychiatry input and okay for discharge home, patient does not want to follow with [facility] so given information for [facility]; denies any suicide/homicide ideation, states he feels much better overall, he is talkative, he is eating well and he is ambulating  Chronic chest wall pain--has been present since #7; on Neurontin 800 mg 4 times daily at home; no delta change greater than 6 and his troponins; EKG is showing sinus bradycardia heart rate 52;  Voltaren gel  Hypertensive urgency with primary hypertension, uncontrolled--not on any medications at home, was started on lisinopril 10 mg daily with improvement; possibly increased secondary to stressors leading him to come to the hospital; blood pressures better controlled  COPD--stable, on room air, on Dulera, Spiriva  History of histoplasmosis  Tobacco abuse--cessation counseling  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    Hospital Course:   Kevin Livingston is a 53 y.o. male admitted to [facility] on 6/15/2024 for suicide ideation; Per H&P done Laura 15, 2024: \"Patient presents emergency department with ongoing suicidal ideation.  Patient reports he has had increased life stressors including his  brother's birthday which have led him to feel suicidal.  Patient denies having a

## 2024-06-18 NOTE — PLAN OF CARE
Problem: Self Harm/Suicidality  Goal: Will have no self-injury during hospital stay  Description: INTERVENTIONS:  1.  Ensure constant observer at bedside with Q15M safety checks  2.  Maintain a safe environment  3.  Secure patient belongings  4.  Ensure family/visitors adhere to safety recommendations  5.  Ensure safety tray has been added to patient's diet order  6.  Every shift and PRN: Re-assess suicidal risk via Frequent Screener    Outcome: Completed     Problem: Discharge Planning  Goal: Discharge to home or other facility with appropriate resources  Outcome: Completed     Problem: Pain  Goal: Verbalizes/displays adequate comfort level or baseline comfort level  Outcome: Completed     Problem: Respiratory - Adult  Goal: Achieves optimal ventilation and oxygenation  Outcome: Completed     Problem: Cardiovascular - Adult  Goal: Maintains optimal cardiac output and hemodynamic stability  Outcome: Completed     Problem: Skin/Tissue Integrity - Adult  Goal: Skin integrity remains intact  Outcome: Completed     Problem: Safety - Adult  Goal: Free from fall injury  Outcome: Completed   Care plan reviewed with patient.  Patient verbalizes understanding of the plan of care and contributes to goal setting.

## 2024-06-19 ENCOUNTER — TELEPHONE (OUTPATIENT)
Dept: FAMILY MEDICINE CLINIC | Age: 54
End: 2024-06-19

## 2024-06-19 NOTE — TELEPHONE ENCOUNTER
Care Transitions Initial Follow Up Call    Outreach made within 2 business days of discharge: Yes    Patient: Kevin Livingston Patient : 1970   MRN: [id number]  Reason for Admission: There are no discharge diagnoses documented for the most recent discharge.  Discharge Date: 24       Spoke with: attempted to contact pt, no answer and unable to leave a vm.    Discharge department/facility: [facility] Interactive Patient Contact:      Scheduled appointment with PCP within 7-14 days    Follow Up  Future Appointments   Date Time Provider Department Center   2024  7:45 AM Aj Cisse, APRN - CNP [facility] - Aditi Dang CMA ([facility])

## 2024-06-20 NOTE — TELEPHONE ENCOUNTER
Care Transitions Initial Follow Up Call    Outreach made within 2 business days of discharge: Yes    Patient: Kevin Livingston Patient : 1970   MRN: [id number]  Reason for Admission: There are no discharge diagnoses documented for the most recent discharge.  Discharge Date: 24       Spoke with: attempted to contact pt, no answer and no mailbox set up yet.    Discharge department/facility: [facility] Interactive Patient Contact:      Scheduled appointment with PCP within 7-14 days    Follow Up  Future Appointments   Date Time Provider Department Center   2024  7:45 AM Aj Cisse, APRN - CNP [facility] - Aditi Dang CMA ([facility])

## 2024-07-02 ENCOUNTER — OFFICE VISIT (OUTPATIENT)
Dept: FAMILY MEDICINE CLINIC | Age: 54
End: 2024-07-02
Payer: MEDICAID

## 2024-07-02 VITALS
OXYGEN SATURATION: 95 % | TEMPERATURE: 97.4 F | RESPIRATION RATE: 20 BRPM | HEART RATE: 81 BPM | BODY MASS INDEX: 19.94 KG/M2 | DIASTOLIC BLOOD PRESSURE: 78 MMHG | SYSTOLIC BLOOD PRESSURE: 130 MMHG | WEIGHT: 163.8 LBS

## 2024-07-02 DIAGNOSIS — Z12.12 ENCOUNTER FOR COLORECTAL CANCER SCREENING: ICD-10-CM

## 2024-07-02 DIAGNOSIS — G47.01 INSOMNIA DUE TO MEDICAL CONDITION: ICD-10-CM

## 2024-07-02 DIAGNOSIS — F43.21 DYSFUNCTIONAL GRIEVING: ICD-10-CM

## 2024-07-02 DIAGNOSIS — Z90.2 S/P LOBECTOMY OF LUNG: ICD-10-CM

## 2024-07-02 DIAGNOSIS — F33.1 MODERATE EPISODE OF RECURRENT MAJOR DEPRESSIVE DISORDER (HCC): Primary | ICD-10-CM

## 2024-07-02 DIAGNOSIS — Z12.5 SCREENING PSA (PROSTATE SPECIFIC ANTIGEN): ICD-10-CM

## 2024-07-02 DIAGNOSIS — F43.0 ACUTE STRESS REACTION: ICD-10-CM

## 2024-07-02 DIAGNOSIS — J41.0 SIMPLE CHRONIC BRONCHITIS (HCC): ICD-10-CM

## 2024-07-02 DIAGNOSIS — Z09 HOSPITAL DISCHARGE FOLLOW-UP: ICD-10-CM

## 2024-07-02 DIAGNOSIS — Z13.220 SCREENING FOR HYPERLIPIDEMIA: ICD-10-CM

## 2024-07-02 DIAGNOSIS — Z12.11 ENCOUNTER FOR COLORECTAL CANCER SCREENING: ICD-10-CM

## 2024-07-02 DIAGNOSIS — G58.8 INTERCOSTAL NEURALGIA: ICD-10-CM

## 2024-07-02 DIAGNOSIS — I10 ESSENTIAL HYPERTENSION: ICD-10-CM

## 2024-07-02 PROCEDURE — 3075F SYST BP GE 130 - 139MM HG: CPT | Performed by: NURSE PRACTITIONER

## 2024-07-02 PROCEDURE — G2211 COMPLEX E/M VISIT ADD ON: HCPCS | Performed by: NURSE PRACTITIONER

## 2024-07-02 PROCEDURE — 1111F DSCHRG MED/CURRENT MED MERGE: CPT | Performed by: NURSE PRACTITIONER

## 2024-07-02 PROCEDURE — 99215 OFFICE O/P EST HI 40 MIN: CPT | Performed by: NURSE PRACTITIONER

## 2024-07-02 PROCEDURE — 3078F DIAST BP <80 MM HG: CPT | Performed by: NURSE PRACTITIONER

## 2024-07-02 RX ORDER — IPRATROPIUM BROMIDE AND ALBUTEROL SULFATE 2.5; .5 MG/3ML; MG/3ML
1 SOLUTION RESPIRATORY (INHALATION) EVERY 4 HOURS
Qty: 360 ML | Refills: 1 | Status: SHIPPED | OUTPATIENT
Start: 2024-07-02

## 2024-07-02 RX ORDER — LISINOPRIL 10 MG/1
10 TABLET ORAL DAILY
Qty: 90 TABLET | Refills: 3 | Status: SHIPPED | OUTPATIENT
Start: 2024-07-02

## 2024-07-02 RX ORDER — ALBUTEROL SULFATE 90 UG/1
2 AEROSOL, METERED RESPIRATORY (INHALATION) EVERY 6 HOURS PRN
Qty: 1 EACH | Refills: 2 | Status: SHIPPED | OUTPATIENT
Start: 2024-07-02

## 2024-07-02 RX ORDER — DOXEPIN HYDROCHLORIDE 25 MG/1
25 CAPSULE ORAL NIGHTLY
Qty: 90 CAPSULE | Refills: 3 | Status: SHIPPED | OUTPATIENT
Start: 2024-07-02

## 2024-07-02 SDOH — ECONOMIC STABILITY: FOOD INSECURITY: WITHIN THE PAST 12 MONTHS, THE FOOD YOU BOUGHT JUST DIDN'T LAST AND YOU DIDN'T HAVE MONEY TO GET MORE.: NEVER TRUE

## 2024-07-02 SDOH — ECONOMIC STABILITY: INCOME INSECURITY: HOW HARD IS IT FOR YOU TO PAY FOR THE VERY BASICS LIKE FOOD, HOUSING, MEDICAL CARE, AND HEATING?: NOT HARD AT ALL

## 2024-07-02 SDOH — ECONOMIC STABILITY: FOOD INSECURITY: WITHIN THE PAST 12 MONTHS, YOU WORRIED THAT YOUR FOOD WOULD RUN OUT BEFORE YOU GOT MONEY TO BUY MORE.: SOMETIMES TRUE

## 2024-07-02 ASSESSMENT — ENCOUNTER SYMPTOMS
NAUSEA: 0
SHORTNESS OF BREATH: 0
COUGH: 0
ABDOMINAL PAIN: 0

## 2024-07-02 NOTE — PROGRESS NOTES
Negative for dizziness, light-headedness and headaches.   Psychiatric/Behavioral: Negative.         Objective:    /78   Pulse 81   Temp 97.4 °F (36.3 °C)   Resp 20   Wt 74.3 kg (163 lb 12.8 oz)   SpO2 95%   BMI 19.94 kg/m²   Physical Exam  Constitutional:       General: He is not in acute distress.  Eyes:      Pupils: Pupils are equal, round, and reactive to light.   Cardiovascular:      Rate and Rhythm: Normal rate and regular rhythm.      Heart sounds: No murmur heard.  Pulmonary:      Effort: Pulmonary effort is normal.      Breath sounds: Normal breath sounds. No wheezing.   Abdominal:      General: Bowel sounds are normal. There is no distension.      Palpations: Abdomen is soft.      Tenderness: There is no abdominal tenderness.   Musculoskeletal:         General: No tenderness. Normal range of motion.      Cervical back: Normal range of motion and neck supple.   Skin:     General: Skin is warm and dry.      Findings: No rash.         An electronic signature was used to authenticate this note.  --Aj Cisse, APRN - CNP

## 2024-08-20 ENCOUNTER — TELEPHONE (OUTPATIENT)
Dept: FAMILY MEDICINE CLINIC | Age: 54
End: 2024-08-20
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
## 2024-10-31 DIAGNOSIS — F33.1 MODERATE EPISODE OF RECURRENT MAJOR DEPRESSIVE DISORDER (HCC): ICD-10-CM

## 2024-10-31 DIAGNOSIS — F43.21 DYSFUNCTIONAL GRIEVING: ICD-10-CM

## 2024-10-31 RX ORDER — BUPROPION HYDROCHLORIDE 150 MG/1
150 TABLET ORAL EVERY MORNING
Qty: 90 TABLET | Refills: 3 | Status: SHIPPED | OUTPATIENT
Start: 2024-10-31

## 2024-10-31 RX ORDER — HYDROXYZINE HYDROCHLORIDE 25 MG/1
25 TABLET, FILM COATED ORAL 3 TIMES DAILY PRN
Qty: 21 TABLET | Refills: 0 | Status: SHIPPED | OUTPATIENT
Start: 2024-10-31 | End: 2024-11-07

## 2024-10-31 NOTE — TELEPHONE ENCOUNTER
Patient requesting refill of Hydroxyzine and Wellbutrin to [facility].  Will check with pharmacy after 4pm.  Please refill if appropriate

## 2024-11-13 ENCOUNTER — OFFICE VISIT (OUTPATIENT)
Dept: FAMILY MEDICINE CLINIC | Age: 54
End: 2024-11-13

## 2024-11-13 VITALS
RESPIRATION RATE: 16 BRPM | HEART RATE: 76 BPM | WEIGHT: 157.3 LBS | DIASTOLIC BLOOD PRESSURE: 74 MMHG | SYSTOLIC BLOOD PRESSURE: 136 MMHG | BODY MASS INDEX: 19.15 KG/M2

## 2024-11-13 DIAGNOSIS — G47.01 INSOMNIA DUE TO MEDICAL CONDITION: ICD-10-CM

## 2024-11-13 DIAGNOSIS — I10 ESSENTIAL HYPERTENSION: ICD-10-CM

## 2024-11-13 DIAGNOSIS — F33.1 MODERATE EPISODE OF RECURRENT MAJOR DEPRESSIVE DISORDER (HCC): Primary | ICD-10-CM

## 2024-11-13 DIAGNOSIS — Z12.12 ENCOUNTER FOR COLORECTAL CANCER SCREENING: ICD-10-CM

## 2024-11-13 DIAGNOSIS — Z90.2 S/P LOBECTOMY OF LUNG: ICD-10-CM

## 2024-11-13 DIAGNOSIS — Z12.5 SCREENING PSA (PROSTATE SPECIFIC ANTIGEN): ICD-10-CM

## 2024-11-13 DIAGNOSIS — J41.0 SIMPLE CHRONIC BRONCHITIS (HCC): ICD-10-CM

## 2024-11-13 DIAGNOSIS — Z12.11 ENCOUNTER FOR COLORECTAL CANCER SCREENING: ICD-10-CM

## 2024-11-13 DIAGNOSIS — F43.21 DYSFUNCTIONAL GRIEVING: ICD-10-CM

## 2024-11-13 RX ORDER — HYDROXYZINE HYDROCHLORIDE 25 MG/1
25 TABLET, FILM COATED ORAL 3 TIMES DAILY PRN
Qty: 60 TABLET | Refills: 1 | Status: SHIPPED | OUTPATIENT
Start: 2024-11-13 | End: 2024-12-23

## 2024-11-13 RX ORDER — SERTRALINE HYDROCHLORIDE 100 MG/1
100 TABLET, FILM COATED ORAL DAILY
Qty: 90 TABLET | Refills: 3 | Status: SHIPPED | OUTPATIENT
Start: 2024-11-13

## 2024-11-13 ASSESSMENT — ENCOUNTER SYMPTOMS
WHEEZING: 1
RHINORRHEA: 0
SHORTNESS OF BREATH: 1
CHEST TIGHTNESS: 0

## 2024-11-13 NOTE — PROGRESS NOTES
aSubjective:      Patient ID: Kevin Livingston is a 54 y.o. male.    HPI: Follow Up    Chief Complaint   Patient presents with    Annual Exam    Medication Refill       Patient Active Problem List   Diagnosis    Left inguinal hernia    Simple chronic bronchitis (HCC)    Chest wall pain following surgery    Empyema lung (HCC)    S/P lobectomy of lung    Histoplasmosis    Depression with anxiety    Intercostal neuralgia    Chest pain    Schizoaffective disorder, depressive type (HCC)    MDD (major depressive disorder), recurrent, severe, with psychosis (HCC)     COLONOSCOPY - due    <1/2 ppd smoking.  Activity quitting.  On Dulera BID and Spiriva.  NEB tx PRN.      Hx of seeing [facility].  Related to chronic intercostal pain s/p lobectomy in 2016.  Was on Norco and Gabapentin.  Had intercostal INJ with no benefits.  Gabapentin was dosed up and he is noticing good benefit of his pain.  Denies pain with coughing anymore.  He is on Gabapentin 800 mg TID.      Continue Zoloft and Wellbutrin.   Doxepin HS for sleep.   Close relationship at his [employer].  Will be looking into [facility] for counseling.       BP Readings from Last 3 Encounters:   11/13/24 136/74   07/02/24 130/78   06/18/24 (!) 168/90       BP wnl.  Lisinopril 10 mg.     Due for annual labs    No results found for: \"LABA1C\"  No results found for: \"EAG\"    No components found for: \"CHLPL\"  No results found for: \"TRIG\"  No results found for: \"HDL\"  No components found for: \"LDLCALC\"  No components found for: \"LABVLDL\"      Chemistry        Component Value Date/Time     06/17/2024 0553    K 4.4 06/17/2024 0553     06/17/2024 0553    CO2 30 06/17/2024 0553    BUN 14 06/17/2024 0553    CREATININE 0.9 06/17/2024 0553        Component Value Date/Time    CALCIUM 9.4 06/17/2024 0553    ALKPHOS 90 06/15/2024 1422    AST 15 06/15/2024 1422    ALT 12 06/15/2024 1422    BILITOT 0.5 06/15/2024 1422            Lab Results   Component Value Date    TSH 1.590

## 2024-12-03 DIAGNOSIS — G58.8 INTERCOSTAL NEURALGIA: ICD-10-CM

## 2024-12-03 RX ORDER — GABAPENTIN 800 MG/1
800 TABLET ORAL 4 TIMES DAILY
Qty: 120 TABLET | Refills: 5 | Status: SHIPPED | OUTPATIENT
Start: 2024-12-03 | End: 2025-06-01

## 2025-01-22 ENCOUNTER — TELEMEDICINE (OUTPATIENT)
Dept: FAMILY MEDICINE CLINIC | Age: 55
End: 2025-01-22

## 2025-01-22 DIAGNOSIS — F25.1 SCHIZOAFFECTIVE DISORDER, DEPRESSIVE TYPE (HCC): ICD-10-CM

## 2025-01-22 DIAGNOSIS — F41.8 DEPRESSION WITH ANXIETY: ICD-10-CM

## 2025-01-22 DIAGNOSIS — F43.0 ACUTE STRESS REACTION: Primary | ICD-10-CM

## 2025-01-22 SDOH — ECONOMIC STABILITY: FOOD INSECURITY: WITHIN THE PAST 12 MONTHS, YOU WORRIED THAT YOUR FOOD WOULD RUN OUT BEFORE YOU GOT MONEY TO BUY MORE.: NEVER TRUE

## 2025-01-22 SDOH — ECONOMIC STABILITY: FOOD INSECURITY: WITHIN THE PAST 12 MONTHS, THE FOOD YOU BOUGHT JUST DIDN'T LAST AND YOU DIDN'T HAVE MONEY TO GET MORE.: NEVER TRUE

## 2025-01-22 ASSESSMENT — ENCOUNTER SYMPTOMS
SHORTNESS OF BREATH: 0
COUGH: 0
ABDOMINAL PAIN: 0
NAUSEA: 0

## 2025-01-22 NOTE — PROGRESS NOTES
Group Topic: BH Coping Skills Education    Date: 3/16/2021  Start Time: 10:00 AM  End Time: 10:50 AM  Facilitators: HALEIGH Angel    Focus:  Problem solving- Cognitive Distortions  Number in attendance: 9    A presentation was given on cognitive distortions and how they lead to inaccurate perceptions and reinforce negative thoughts and emotions. Discussion included review of each cognitive distortion and ways to restructure thinking.     Attendance: Present   Patient Response: Attentive and Good eye contact    Rosa was engaged and participated in group discussion. She shared an example of the mind reading cognitive distortion when feeling like others are judging her in social situations.    HALEIGH Angel       Skin:  Negative for rash.   Neurological:  Negative for dizziness, light-headedness and headaches.   Psychiatric/Behavioral:  Positive for behavioral problems, dysphoric mood and sleep disturbance. Negative for hallucinations and suicidal ideas. The patient is nervous/anxious and is hyperactive.        Objective:   Physical Exam  Constitutional:       General: He is not in acute distress.     Appearance: He is not ill-appearing.   Pulmonary:      Effort: Pulmonary effort is normal. No respiratory distress.   Neurological:      Mental Status: He is alert and oriented to person, place, and time.   Psychiatric:         Attention and Perception: Attention normal.         Mood and Affect: Mood is anxious and depressed.         Speech: Speech normal.         Behavior: Behavior is slowed, withdrawn and hyperactive.         Thought Content: Thought content includes suicidal ideation. Thought content does not include suicidal plan.         Cognition and Memory: Cognition normal.         Judgment: Judgment normal.         Assessment:       Diagnosis Orders   1. Acute stress reaction        2. Depression with anxiety        3. Schizoaffective disorder, depressive type (HCC)            Plan:     Discussion on his safety and wether require ED evaluation  Patient to discuss with sister and girlfriend  Recommend Abilify add on and increase Zoloft if decide OP tx  Would not hesitate going to ED if safety concern   - patient denies active suicide  Patient to give update to office ASAP      Kevin Livingston, was evaluated through a synchronous (real-time) audio-video encounter. The patient (or guardian if applicable) is aware that this is a billable service, which includes applicable co-pays. This Virtual Visit was conducted with patient's (and/or legal guardian's) consent. Patient identification was verified, and a caregiver was present when appropriate.   The patient was located at Home: [address] [id number]  Provider

## 2025-02-11 ENCOUNTER — OFFICE VISIT (OUTPATIENT)
Dept: FAMILY MEDICINE CLINIC | Age: 55
End: 2025-02-11
Payer: MEDICAID

## 2025-02-11 VITALS
SYSTOLIC BLOOD PRESSURE: 138 MMHG | DIASTOLIC BLOOD PRESSURE: 100 MMHG | RESPIRATION RATE: 20 BRPM | HEART RATE: 80 BPM | WEIGHT: 156.9 LBS | BODY MASS INDEX: 19.1 KG/M2

## 2025-02-11 DIAGNOSIS — F43.21 DYSFUNCTIONAL GRIEVING: ICD-10-CM

## 2025-02-11 DIAGNOSIS — F33.1 MODERATE EPISODE OF RECURRENT MAJOR DEPRESSIVE DISORDER (HCC): Primary | ICD-10-CM

## 2025-02-11 PROCEDURE — G2211 COMPLEX E/M VISIT ADD ON: HCPCS | Performed by: NURSE PRACTITIONER

## 2025-02-11 PROCEDURE — 99214 OFFICE O/P EST MOD 30 MIN: CPT | Performed by: NURSE PRACTITIONER

## 2025-02-11 RX ORDER — SERTRALINE HYDROCHLORIDE 100 MG/1
200 TABLET, FILM COATED ORAL DAILY
Qty: 180 TABLET | Refills: 3 | Status: SHIPPED | OUTPATIENT
Start: 2025-02-11

## 2025-02-11 ASSESSMENT — PATIENT HEALTH QUESTIONNAIRE - PHQ9
10. IF YOU CHECKED OFF ANY PROBLEMS, HOW DIFFICULT HAVE THESE PROBLEMS MADE IT FOR YOU TO DO YOUR WORK, TAKE CARE OF THINGS AT HOME, OR GET ALONG WITH OTHER PEOPLE: NOT DIFFICULT AT ALL
SUM OF ALL RESPONSES TO PHQ QUESTIONS 1-9: 7
SUM OF ALL RESPONSES TO PHQ QUESTIONS 1-9: 7
4. FEELING TIRED OR HAVING LITTLE ENERGY: MORE THAN HALF THE DAYS
8. MOVING OR SPEAKING SO SLOWLY THAT OTHER PEOPLE COULD HAVE NOTICED. OR THE OPPOSITE, BEING SO FIGETY OR RESTLESS THAT YOU HAVE BEEN MOVING AROUND A LOT MORE THAN USUAL: SEVERAL DAYS
SUM OF ALL RESPONSES TO PHQ QUESTIONS 1-9: 7
SUM OF ALL RESPONSES TO PHQ QUESTIONS 1-9: 7
7. TROUBLE CONCENTRATING ON THINGS, SUCH AS READING THE NEWSPAPER OR WATCHING TELEVISION: SEVERAL DAYS
2. FEELING DOWN, DEPRESSED OR HOPELESS: SEVERAL DAYS
3. TROUBLE FALLING OR STAYING ASLEEP: MORE THAN HALF THE DAYS
6. FEELING BAD ABOUT YOURSELF - OR THAT YOU ARE A FAILURE OR HAVE LET YOURSELF OR YOUR FAMILY DOWN: NOT AT ALL
9. THOUGHTS THAT YOU WOULD BE BETTER OFF DEAD, OR OF HURTING YOURSELF: NOT AT ALL
5. POOR APPETITE OR OVEREATING: NOT AT ALL

## 2025-02-11 ASSESSMENT — ENCOUNTER SYMPTOMS
NAUSEA: 0
ABDOMINAL PAIN: 0
COUGH: 0
SHORTNESS OF BREATH: 0

## 2025-02-11 NOTE — PATIENT INSTRUCTIONS
You may receive a survey about your visit with us today. The feedback from our patients helps us identify what is working well and where the service to all patients can be enhanced. Thank you!     Tobacco Cessation Programs     Telephonic behavior modification  1-800-QUIT-NOW (166-2855)  Counseling service for those who are ready to quit using tobacco.    Available for uninsured [place] residents, Medicaid recipients, pregnant women, or patients whose health plans or employers are members of the [place] [employer]    Online behavior modification  http://[place].Quitlogix.org  Online support program to help patients through each step of the quitting process.  Available 24 hours a day 7 days a week.  Provides up to date researched based tool, step-by-step guides, and motivational messages.    Online behavior modification  www.lungusa.org/stop-smoking/how-to-quit  HelpLine: 1-800-LUNG-[place] (452-8114)  Email questions to:  questions@[facility].org   Website offers resources to help tobacco users figure out their reasons for quitting and then take the big step of quitting for good.    Hypnosis  Location: [address] [place] [address]  Contact: Madeleine Zaidi, PhD at [phone]  Hypnosis for tobacco cessation  Cost $225 for the initial session and $175 for each session afterwards.  Most patients require 6-8 sessions.  There is the option to submit through the patient’s insurance.    Hypnosis and behavior modification  Location: [address] [place] [address]  Contact: Harriet Zuniga, PhD at [phone]  Counseling and hypnosis for nicotine addition  Cost: For uninsured patients:  Please call above phone number  Cost for insured patients depends on patient’s insurance plan.    Behavior modification  Location: [facility], [address] [place] [address]  Contact: [phone]  Services include four one-on-one appointments between the patient and a respiratory therapist.  The four appointments

## 2025-02-11 NOTE — PROGRESS NOTES
Subjective:      Patient ID: Kevin Livingston is a 54 y.o. male    HPI:  Discuss Medications    Chief Complaint   Patient presents with    Fussy     Feels his meds are off       Was admitted for depression and suicidal thoughts in Summer 2024.  Was placed on Zoloft and Wellbutrin at that time.  Hospital follow up he was doing well.   Had good support at his [employer] and was looking into [facility] for counseling. Long hx of depression and anxiety.     Continues on Zoloft 100 mg and Wellbutrin 150 mg XL.    Was seen for VV on 1/20/24. Very emotional.   Family issues going on acute stressor.  Relationship issues with girlfriend.   Overwhelmed.   Suicidal thoughts - chronic does not feel more intense  Denies plan.  Options given regarding go to ED, adjusting Zoloft or adding Abilify.  Patient would think it over - did not hear back form patient.     Presents today with continued same symptoms.  Has good support with sister.   Has good support at [employer] and has had some counseling with .     Patient Active Problem List   Diagnosis    Left inguinal hernia    Simple chronic bronchitis (HCC)    Chest wall pain following surgery    Empyema lung (HCC)    S/P lobectomy of lung    Histoplasmosis    Depression with anxiety    Intercostal neuralgia    Chest pain    Schizoaffective disorder, depressive type (HCC)    MDD (major depressive disorder), recurrent, severe, with psychosis (HCC)       Review of Systems   Constitutional:  Negative for chills and fever.   HENT: Negative.     Respiratory:  Negative for cough and shortness of breath.    Cardiovascular:  Negative for chest pain.   Gastrointestinal:  Negative for abdominal pain and nausea.   Skin:  Negative for rash.   Neurological:  Negative for dizziness, light-headedness and headaches.   Psychiatric/Behavioral:  Positive for behavioral problems and sleep disturbance. Negative for suicidal ideas. The patient is nervous/anxious and is hyperactive.        Objective:

## 2025-03-03 RX ORDER — HYDROXYZINE HYDROCHLORIDE 25 MG/1
25 TABLET, FILM COATED ORAL 3 TIMES DAILY PRN
Qty: 60 TABLET | Refills: 1 | Status: SHIPPED | OUTPATIENT
Start: 2025-03-03

## 2025-04-22 ENCOUNTER — COMMUNITY OUTREACH (OUTPATIENT)
Dept: FAMILY MEDICINE CLINIC | Age: 55
End: 2025-04-22

## 2025-04-30 RX ORDER — HYDROXYZINE HYDROCHLORIDE 25 MG/1
25 TABLET, FILM COATED ORAL 3 TIMES DAILY PRN
Qty: 60 TABLET | Refills: 1 | Status: SHIPPED | OUTPATIENT
Start: 2025-04-30

## 2025-05-16 ENCOUNTER — OFFICE VISIT (OUTPATIENT)
Dept: FAMILY MEDICINE CLINIC | Age: 55
End: 2025-05-16
Payer: MEDICAID

## 2025-05-16 VITALS
RESPIRATION RATE: 20 BRPM | BODY MASS INDEX: 17.63 KG/M2 | SYSTOLIC BLOOD PRESSURE: 148 MMHG | DIASTOLIC BLOOD PRESSURE: 88 MMHG | HEART RATE: 68 BPM | WEIGHT: 144.8 LBS

## 2025-05-16 DIAGNOSIS — Z12.12 ENCOUNTER FOR COLORECTAL CANCER SCREENING: ICD-10-CM

## 2025-05-16 DIAGNOSIS — Z12.5 SCREENING PSA (PROSTATE SPECIFIC ANTIGEN): ICD-10-CM

## 2025-05-16 DIAGNOSIS — J41.0 SIMPLE CHRONIC BRONCHITIS (HCC): ICD-10-CM

## 2025-05-16 DIAGNOSIS — F39 MOOD DISORDER: ICD-10-CM

## 2025-05-16 DIAGNOSIS — I10 ESSENTIAL HYPERTENSION: ICD-10-CM

## 2025-05-16 DIAGNOSIS — F33.1 MODERATE EPISODE OF RECURRENT MAJOR DEPRESSIVE DISORDER (HCC): Primary | ICD-10-CM

## 2025-05-16 DIAGNOSIS — Z90.2 S/P LOBECTOMY OF LUNG: ICD-10-CM

## 2025-05-16 DIAGNOSIS — Z12.11 ENCOUNTER FOR COLORECTAL CANCER SCREENING: ICD-10-CM

## 2025-05-16 DIAGNOSIS — F43.21 DYSFUNCTIONAL GRIEVING: ICD-10-CM

## 2025-05-16 DIAGNOSIS — Z13.220 SCREENING FOR HYPERLIPIDEMIA: ICD-10-CM

## 2025-05-16 PROCEDURE — 3077F SYST BP >= 140 MM HG: CPT | Performed by: NURSE PRACTITIONER

## 2025-05-16 PROCEDURE — 99214 OFFICE O/P EST MOD 30 MIN: CPT | Performed by: NURSE PRACTITIONER

## 2025-05-16 PROCEDURE — 3079F DIAST BP 80-89 MM HG: CPT | Performed by: NURSE PRACTITIONER

## 2025-05-16 RX ORDER — SERTRALINE HYDROCHLORIDE 100 MG/1
100 TABLET, FILM COATED ORAL DAILY
Qty: 90 TABLET | Refills: 3
Start: 2025-05-16

## 2025-05-16 RX ORDER — PREDNISONE 20 MG/1
20 TABLET ORAL 2 TIMES DAILY
Qty: 10 TABLET | Refills: 0 | Status: SHIPPED | OUTPATIENT
Start: 2025-05-16 | End: 2025-05-21

## 2025-05-16 RX ORDER — AZITHROMYCIN 250 MG/1
TABLET, FILM COATED ORAL
Qty: 6 TABLET | Refills: 0 | Status: SHIPPED | OUTPATIENT
Start: 2025-05-16 | End: 2025-05-26

## 2025-05-16 RX ORDER — ARIPIPRAZOLE 5 MG/1
5 TABLET ORAL DAILY
Qty: 30 TABLET | Refills: 3 | Status: SHIPPED | OUTPATIENT
Start: 2025-05-16

## 2025-05-16 ASSESSMENT — ENCOUNTER SYMPTOMS
CHEST TIGHTNESS: 0
SHORTNESS OF BREATH: 1
RHINORRHEA: 1
WHEEZING: 1
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
## 2025-05-16 NOTE — PATIENT INSTRUCTIONS
You may receive a survey about your visit with us today. The feedback from our patients helps us identify what is working well and where the service to all patients can be enhanced. Thank you!     Tobacco Cessation Programs     Telephonic behavior modification  1-800-QUIT-NOW (017-5247)  Counseling service for those who are ready to quit using tobacco.    Available for uninsured [place] residents, Medicaid recipients, pregnant women, or patients whose health plans or employers are members of the [place] [employer]    Online behavior modification  http://[place].Quitlogix.org  Online support program to help patients through each step of the quitting process.  Available 24 hours a day 7 days a week.  Provides up to date researched based tool, step-by-step guides, and motivational messages.    Online behavior modification  www.lungusa.org/stop-smoking/how-to-quit  HelpLine: 1-800-LUNG-[place] (705-5055)  Email questions to:  questions@[employer].org   Website offers resources to help tobacco users figure out their reasons for quitting and then take the big step of quitting for good.    Hypnosis  Location: [address] [place] [address]  Contact: Madeleine Zaidi, PhD at [phone]  Hypnosis for tobacco cessation  Cost $225 for the initial session and $175 for each session afterwards.  Most patients require 6-8 sessions.  There is the option to submit through the patient’s insurance.    Hypnosis and behavior modification  Location: [address] [place] [address]  Contact: Harrite Zuniga, PhD at [phone]  Counseling and hypnosis for nicotine addition  Cost: For uninsured patients:  Please call above phone number  Cost for insured patients depends on patient’s insurance plan.    Behavior modification  Location: [facility], [address] [place] [address]  Contact: [phone]  Services include four one-on-one appointments between the patient and a respiratory therapist.  The four appointments

## 2025-06-01 DIAGNOSIS — G58.8 INTERCOSTAL NEURALGIA: ICD-10-CM

## 2025-06-02 RX ORDER — GABAPENTIN 800 MG/1
TABLET ORAL
Qty: 120 TABLET | Refills: 5 | Status: SHIPPED | OUTPATIENT
Start: 2025-06-02 | End: 2025-11-29

## 2025-06-18 ENCOUNTER — TELEPHONE (OUTPATIENT)
Dept: FAMILY MEDICINE CLINIC | Age: 55
End: 2025-06-18

## 2025-06-18 NOTE — TELEPHONE ENCOUNTER
Patient is rescheduled for his visit to Friday. Patient is requesting a letter for his AEP. So it does not get disconnected.Please advise.

## 2025-06-20 ENCOUNTER — OFFICE VISIT (OUTPATIENT)
Dept: FAMILY MEDICINE CLINIC | Age: 55
End: 2025-06-20
Payer: MEDICAID

## 2025-06-20 VITALS
HEIGHT: 76 IN | WEIGHT: 150.2 LBS | BODY MASS INDEX: 18.29 KG/M2 | HEART RATE: 88 BPM | SYSTOLIC BLOOD PRESSURE: 134 MMHG | RESPIRATION RATE: 18 BRPM | DIASTOLIC BLOOD PRESSURE: 70 MMHG

## 2025-06-20 DIAGNOSIS — I10 ESSENTIAL HYPERTENSION: ICD-10-CM

## 2025-06-20 DIAGNOSIS — J41.0 SIMPLE CHRONIC BRONCHITIS (HCC): ICD-10-CM

## 2025-06-20 DIAGNOSIS — Z90.2 S/P LOBECTOMY OF LUNG: ICD-10-CM

## 2025-06-20 DIAGNOSIS — F39 MOOD DISORDER: Primary | ICD-10-CM

## 2025-06-20 DIAGNOSIS — F33.1 MODERATE EPISODE OF RECURRENT MAJOR DEPRESSIVE DISORDER (HCC): ICD-10-CM

## 2025-06-20 DIAGNOSIS — G47.01 INSOMNIA DUE TO MEDICAL CONDITION: ICD-10-CM

## 2025-06-20 DIAGNOSIS — G58.8 INTERCOSTAL NEURALGIA: ICD-10-CM

## 2025-06-20 PROCEDURE — 3075F SYST BP GE 130 - 139MM HG: CPT | Performed by: NURSE PRACTITIONER

## 2025-06-20 PROCEDURE — 3078F DIAST BP <80 MM HG: CPT | Performed by: NURSE PRACTITIONER

## 2025-06-20 PROCEDURE — 99214 OFFICE O/P EST MOD 30 MIN: CPT | Performed by: NURSE PRACTITIONER

## 2025-06-20 RX ORDER — ARIPIPRAZOLE 10 MG/1
10 TABLET ORAL DAILY
Qty: 90 TABLET | Refills: 3 | Status: SHIPPED | OUTPATIENT
Start: 2025-06-20

## 2025-06-20 RX ORDER — IPRATROPIUM BROMIDE AND ALBUTEROL SULFATE 2.5; .5 MG/3ML; MG/3ML
1 SOLUTION RESPIRATORY (INHALATION) EVERY 4 HOURS
Qty: 360 ML | Refills: 1 | Status: SHIPPED | OUTPATIENT
Start: 2025-06-20

## 2025-06-20 RX ORDER — LISINOPRIL 10 MG/1
10 TABLET ORAL DAILY
Qty: 90 TABLET | Refills: 3 | Status: SHIPPED | OUTPATIENT
Start: 2025-06-20

## 2025-06-20 RX ORDER — DOXEPIN HYDROCHLORIDE 25 MG/1
25 CAPSULE ORAL NIGHTLY
Qty: 90 CAPSULE | Refills: 3 | Status: SHIPPED | OUTPATIENT
Start: 2025-06-20

## 2025-06-20 ASSESSMENT — ENCOUNTER SYMPTOMS
CHEST TIGHTNESS: 0
SHORTNESS OF BREATH: 1
WHEEZING: 1
RHINORRHEA: 1
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
## 2025-06-30 ENCOUNTER — TELEPHONE (OUTPATIENT)
Dept: FAMILY MEDICINE CLINIC | Age: 55
End: 2025-06-30
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
## 2025-06-30 NOTE — TELEPHONE ENCOUNTER
Pt called back. He would like a call back once the form has been complete. [employer] says as long as they receive the form back by 3pm today they will get the electric turned back on today.     Pt cell numbers in chart are not working right now. Call pt back on his neighbor's phone [phone].

## 2025-07-31 ENCOUNTER — TELEPHONE (OUTPATIENT)
Dept: FAMILY MEDICINE CLINIC | Age: 55
End: 2025-07-31

## 2025-08-16 ENCOUNTER — APPOINTMENT (OUTPATIENT)
Dept: GENERAL RADIOLOGY | Age: 55
End: 2025-08-16
Payer: MEDICAID

## 2025-08-16 ENCOUNTER — HOSPITAL ENCOUNTER (EMERGENCY)
Age: 55
Discharge: HOME | End: 2025-08-16
Attending: STUDENT IN AN ORGANIZED HEALTH CARE EDUCATION/TRAINING PROGRAM
Payer: MEDICAID

## 2025-08-16 VITALS
SYSTOLIC BLOOD PRESSURE: 145 MMHG | HEART RATE: 81 BPM | OXYGEN SATURATION: 97 % | DIASTOLIC BLOOD PRESSURE: 92 MMHG | RESPIRATION RATE: 14 BRPM | TEMPERATURE: 98.3 F

## 2025-08-16 DIAGNOSIS — S61.411A LACERATION OF RIGHT HAND WITHOUT FOREIGN BODY, INITIAL ENCOUNTER: Primary | ICD-10-CM

## 2025-08-16 PROCEDURE — 6360000002 HC RX W HCPCS: Performed by: STUDENT IN AN ORGANIZED HEALTH CARE EDUCATION/TRAINING PROGRAM

## 2025-08-16 PROCEDURE — 99284 EMERGENCY DEPT VISIT MOD MDM: CPT

## 2025-08-16 PROCEDURE — 73130 X-RAY EXAM OF HAND: CPT

## 2025-08-16 PROCEDURE — 6370000000 HC RX 637 (ALT 250 FOR IP): Performed by: STUDENT IN AN ORGANIZED HEALTH CARE EDUCATION/TRAINING PROGRAM

## 2025-08-16 PROCEDURE — 90715 TDAP VACCINE 7 YRS/> IM: CPT | Performed by: STUDENT IN AN ORGANIZED HEALTH CARE EDUCATION/TRAINING PROGRAM

## 2025-08-16 PROCEDURE — 12002 RPR S/N/AX/GEN/TRNK2.6-7.5CM: CPT

## 2025-08-16 PROCEDURE — 90471 IMMUNIZATION ADMIN: CPT | Performed by: STUDENT IN AN ORGANIZED HEALTH CARE EDUCATION/TRAINING PROGRAM

## 2025-08-16 RX ORDER — GINSENG 100 MG
CAPSULE ORAL ONCE
Status: COMPLETED | OUTPATIENT
Start: 2025-08-16 | End: 2025-08-16

## 2025-08-16 RX ORDER — LIDOCAINE HYDROCHLORIDE AND EPINEPHRINE 10; 10 MG/ML; UG/ML
20 INJECTION, SOLUTION INFILTRATION; PERINEURAL ONCE
Status: COMPLETED | OUTPATIENT
Start: 2025-08-16 | End: 2025-08-16

## 2025-08-16 RX ADMIN — BACITRACIN: 500 OINTMENT TOPICAL at 11:17

## 2025-08-16 RX ADMIN — TETANUS TOXOID, REDUCED DIPHTHERIA TOXOID AND ACELLULAR PERTUSSIS VACCINE, ADSORBED 0.5 ML: 5; 2.5; 8; 8; 2.5 SUSPENSION INTRAMUSCULAR at 09:30

## 2025-08-16 RX ADMIN — LIDOCAINE HYDROCHLORIDE AND EPINEPHRINE 20 ML: 10; 10 INJECTION, SOLUTION INFILTRATION; PERINEURAL at 11:18

## 2025-09-02 RX ORDER — HYDROXYZINE HYDROCHLORIDE 25 MG/1
25 TABLET, FILM COATED ORAL 3 TIMES DAILY PRN
Qty: 60 TABLET | Refills: 1 | Status: ON HOLD | OUTPATIENT
Start: 2025-09-02

## 2025-09-07 PROBLEM — F12.20 CANNABIS USE DISORDER, SEVERE, DEPENDENCE (HCC): Chronic | Status: ACTIVE | Noted: 2025-09-07

## 2025-09-07 PROBLEM — F32.2 MDD (MAJOR DEPRESSIVE DISORDER), SEVERE (HCC): Status: ACTIVE | Noted: 2025-09-07

## 2025-09-07 PROBLEM — F70 MILD INTELLECTUAL DISABILITIES: Chronic | Status: ACTIVE | Noted: 2025-09-07

## 2025-09-07 PROBLEM — F14.20 COCAINE USE DISORDER, SEVERE, DEPENDENCE (HCC): Chronic | Status: ACTIVE | Noted: 2025-09-07
